# Patient Record
Sex: MALE | Race: BLACK OR AFRICAN AMERICAN | NOT HISPANIC OR LATINO | Employment: FULL TIME | URBAN - METROPOLITAN AREA
[De-identification: names, ages, dates, MRNs, and addresses within clinical notes are randomized per-mention and may not be internally consistent; named-entity substitution may affect disease eponyms.]

---

## 2022-08-17 ENCOUNTER — HOSPITAL ENCOUNTER (EMERGENCY)
Facility: HOSPITAL | Age: 62
Discharge: HOME | End: 2022-08-17
Attending: FAMILY MEDICINE
Payer: COMMERCIAL

## 2022-08-17 VITALS
TEMPERATURE: 98 F | DIASTOLIC BLOOD PRESSURE: 70 MMHG | RESPIRATION RATE: 20 BRPM | OXYGEN SATURATION: 100 % | SYSTOLIC BLOOD PRESSURE: 128 MMHG | HEIGHT: 71 IN | HEART RATE: 66 BPM | WEIGHT: 146.5 LBS | BODY MASS INDEX: 20.51 KG/M2

## 2022-08-17 DIAGNOSIS — R30.0 DYSURIA: ICD-10-CM

## 2022-08-17 DIAGNOSIS — Z20.2 TRICHOMONAS EXPOSURE: Primary | ICD-10-CM

## 2022-08-17 PROCEDURE — 99283 EMERGENCY DEPT VISIT LOW MDM: CPT

## 2022-08-17 RX ORDER — METRONIDAZOLE 500 MG/1
500 TABLET ORAL 2 TIMES DAILY
Qty: 14 TABLET | Refills: 0 | Status: SHIPPED | OUTPATIENT
Start: 2022-08-17 | End: 2022-08-24

## 2022-08-17 NOTE — ED PROVIDER NOTES
Encounter Date: 8/17/2022       History     Chief Complaint   Patient presents with    Urinary Frequency    Exposure to STD     61-year-old gentleman presents emergency room for evaluation.  Reports slight dysuria the last 2-3 days.  Patient reports that his partner was recently diagnosed with Trichomonas, patient was told to come to the emergency room for evaluation.  Patient denies any chest pain or shortness of breath.  Denies fever chills.  Denies abdominal pain.  Denies constipation or diarrhea.  Patient reports feeling his normal state of health otherwise.        Review of patient's allergies indicates:  No Known Allergies  No past medical history on file.  No past surgical history on file.  No family history on file.     Review of Systems   Constitutional: Negative for chills, fatigue and fever.   HENT: Negative for ear pain, rhinorrhea and sore throat.    Eyes: Negative for photophobia and pain.   Respiratory: Negative for cough, shortness of breath and wheezing.    Cardiovascular: Negative for chest pain.   Gastrointestinal: Negative for abdominal pain, diarrhea, nausea and vomiting.   Genitourinary: Positive for dysuria.   Neurological: Negative for dizziness, weakness and headaches.   All other systems reviewed and are negative.      Physical Exam     Initial Vitals [08/17/22 0222]   BP Pulse Resp Temp SpO2   128/70 66 20 98.1 °F (36.7 °C) 100 %      MAP       --         Physical Exam    Nursing note and vitals reviewed.  Constitutional: He appears well-developed and well-nourished.   HENT:   Head: Normocephalic and atraumatic.   Eyes: EOM are normal. Pupils are equal, round, and reactive to light.   Neck: Neck supple.   Normal range of motion.  Cardiovascular: Normal rate, regular rhythm and normal heart sounds. Exam reveals no gallop and no friction rub.    No murmur heard.  Pulmonary/Chest: Breath sounds normal. No respiratory distress.   Abdominal: Abdomen is soft. Bowel sounds are normal. He  exhibits no distension. There is no abdominal tenderness.   Musculoskeletal:         General: Normal range of motion.      Cervical back: Normal range of motion and neck supple.     Neurological: He is alert and oriented to person, place, and time. He has normal strength.   Skin: Skin is warm and dry.   Psychiatric: He has a normal mood and affect. His behavior is normal. Judgment and thought content normal.         ED Course   Procedures  Labs Reviewed - No data to display       Imaging Results    None          Medications - No data to display  Medical Decision Making:   Initial Assessment:   Discussed with patient regarding findings.  Will treat with Flagyl for suspected Trichomonas.  Discussed the patient about obtaining urinalysis for further checks, but patient currently interested in just being treated for the Trichomonas.  Will begin with this, patient informed that if he has recurrent symptoms or additional symptoms then to return to the emergency room or follow up primary care physician for further evaluation and treatment.                      Clinical Impression:   Final diagnoses:  [Z20.2] Trichomonas exposure (Primary)  [R30.0] Dysuria          ED Disposition Condition    Discharge Stable        ED Prescriptions     Medication Sig Dispense Start Date End Date Auth. Provider    metroNIDAZOLE (FLAGYL) 500 MG tablet Take 1 tablet (500 mg total) by mouth 2 (two) times a day. for 7 days 14 tablet 8/17/2022 8/24/2022 Taiwo Ling MD        Follow-up Information     Follow up With Specialties Details Why Contact Info    [facility] Emergency Dept Emergency Medicine  As needed, If symptoms worsen [address] [facility] [address] [phone]    Primary Care Physician  In 5 days             Taiwo Ling MD  08/17/22 0239       Taiwo Ling MD  08/17/22 0239

## 2025-03-20 ENCOUNTER — HOSPITAL ENCOUNTER (EMERGENCY)
Facility: HOSPITAL | Age: 65
Discharge: HOME | End: 2025-03-20
Attending: EMERGENCY MEDICINE
Payer: COMMERCIAL

## 2025-03-20 VITALS
HEIGHT: 72 IN | SYSTOLIC BLOOD PRESSURE: 117 MMHG | TEMPERATURE: 97 F | DIASTOLIC BLOOD PRESSURE: 70 MMHG | OXYGEN SATURATION: 99 % | WEIGHT: 134.94 LBS | RESPIRATION RATE: 18 BRPM | BODY MASS INDEX: 18.28 KG/M2 | HEART RATE: 60 BPM

## 2025-03-20 DIAGNOSIS — M54.9 BILATERAL BACK PAIN, UNSPECIFIED BACK LOCATION, UNSPECIFIED CHRONICITY: ICD-10-CM

## 2025-03-20 DIAGNOSIS — C18.9 MALIGNANT NEOPLASM OF COLON, UNSPECIFIED PART OF COLON: Primary | ICD-10-CM

## 2025-03-20 DIAGNOSIS — C78.7 METASTASES TO THE LIVER: ICD-10-CM

## 2025-03-20 LAB
ALBUMIN SERPL-MCNC: 3.5 G/DL (ref 3.4–4.8)
ALBUMIN/GLOB SERPL: 0.9 RATIO (ref 1.1–2)
ALP SERPL-CCNC: 262 UNIT/L (ref 40–150)
ALT SERPL-CCNC: 32 UNIT/L (ref 0–55)
ANION GAP SERPL CALC-SCNC: 6 MEQ/L
AST SERPL-CCNC: 48 UNIT/L (ref 5–34)
BACTERIA #/AREA URNS AUTO: ABNORMAL /HPF
BASOPHILS # BLD AUTO: 0.07 X10(3)/MCL
BASOPHILS NFR BLD AUTO: 1.1 %
BILIRUB SERPL-MCNC: 0.3 MG/DL
BILIRUB UR QL STRIP.AUTO: NEGATIVE
BUN SERPL-MCNC: 18.1 MG/DL (ref 8.4–25.7)
CALCIUM SERPL-MCNC: 9.5 MG/DL (ref 8.8–10)
CHLORIDE SERPL-SCNC: 106 MMOL/L (ref 98–107)
CLARITY UR: CLEAR
CO2 SERPL-SCNC: 27 MMOL/L (ref 23–31)
COLOR UR AUTO: YELLOW
CREAT SERPL-MCNC: 0.83 MG/DL (ref 0.72–1.25)
CREAT/UREA NIT SERPL: 22
EOSINOPHIL # BLD AUTO: 0.22 X10(3)/MCL (ref 0–0.9)
EOSINOPHIL NFR BLD AUTO: 3.6 %
ERYTHROCYTE [DISTWIDTH] IN BLOOD BY AUTOMATED COUNT: 12.7 % (ref 11.5–17)
GFR SERPLBLD CREATININE-BSD FMLA CKD-EPI: >60 ML/MIN/1.73/M2
GLOBULIN SER-MCNC: 3.8 GM/DL (ref 2.4–3.5)
GLUCOSE SERPL-MCNC: 89 MG/DL (ref 82–115)
GLUCOSE UR QL STRIP: NORMAL
HCT VFR BLD AUTO: 36.3 % (ref 42–52)
HGB BLD-MCNC: 12.4 G/DL (ref 14–18)
HGB UR QL STRIP: NEGATIVE
HOLD SPECIMEN: NORMAL
HYALINE CASTS #/AREA URNS LPF: ABNORMAL /LPF
IMM GRANULOCYTES # BLD AUTO: 0.01 X10(3)/MCL (ref 0–0.04)
IMM GRANULOCYTES NFR BLD AUTO: 0.2 %
KETONES UR QL STRIP: NEGATIVE
LEUKOCYTE ESTERASE UR QL STRIP: NEGATIVE
LIPASE SERPL-CCNC: 23 U/L
LYMPHOCYTES # BLD AUTO: 2 X10(3)/MCL (ref 0.6–4.6)
LYMPHOCYTES NFR BLD AUTO: 32.3 %
MAGNESIUM SERPL-MCNC: 2.1 MG/DL (ref 1.6–2.6)
MCH RBC QN AUTO: 31.7 PG (ref 27–31)
MCHC RBC AUTO-ENTMCNC: 34.2 G/DL (ref 33–36)
MCV RBC AUTO: 92.8 FL (ref 80–94)
MONOCYTES # BLD AUTO: 0.54 X10(3)/MCL (ref 0.1–1.3)
MONOCYTES NFR BLD AUTO: 8.7 %
MUCOUS THREADS URNS QL MICRO: ABNORMAL /LPF
NEUTROPHILS # BLD AUTO: 3.35 X10(3)/MCL (ref 2.1–9.2)
NEUTROPHILS NFR BLD AUTO: 54.1 %
NITRITE UR QL STRIP: NEGATIVE
NRBC BLD AUTO-RTO: 0 %
PH UR STRIP: 6.5 [PH]
PLATELET # BLD AUTO: 368 X10(3)/MCL (ref 130–400)
PMV BLD AUTO: 8.2 FL (ref 7.4–10.4)
POTASSIUM SERPL-SCNC: 4.5 MMOL/L (ref 3.5–5.1)
PROT SERPL-MCNC: 7.3 GM/DL (ref 5.8–7.6)
PROT UR QL STRIP: ABNORMAL
RBC # BLD AUTO: 3.91 X10(6)/MCL (ref 4.7–6.1)
RBC #/AREA URNS AUTO: ABNORMAL /HPF
SODIUM SERPL-SCNC: 139 MMOL/L (ref 136–145)
SP GR UR STRIP.AUTO: >1.03 (ref 1–1.03)
SQUAMOUS #/AREA URNS LPF: ABNORMAL /HPF
TSH SERPL-ACNC: 1.32 UIU/ML (ref 0.35–4.94)
UROBILINOGEN UR STRIP-ACNC: ABNORMAL
WBC # BLD AUTO: 6.19 X10(3)/MCL (ref 4.5–11.5)
WBC #/AREA URNS AUTO: ABNORMAL /HPF

## 2025-03-20 PROCEDURE — 25500020 PHARM REV CODE 255

## 2025-03-20 PROCEDURE — 84443 ASSAY THYROID STIM HORMONE: CPT | Performed by: EMERGENCY MEDICINE

## 2025-03-20 PROCEDURE — 81015 MICROSCOPIC EXAM OF URINE: CPT | Performed by: EMERGENCY MEDICINE

## 2025-03-20 PROCEDURE — 83735 ASSAY OF MAGNESIUM: CPT | Performed by: EMERGENCY MEDICINE

## 2025-03-20 PROCEDURE — 83690 ASSAY OF LIPASE: CPT | Performed by: EMERGENCY MEDICINE

## 2025-03-20 PROCEDURE — 85025 COMPLETE CBC W/AUTO DIFF WBC: CPT | Performed by: EMERGENCY MEDICINE

## 2025-03-20 PROCEDURE — 99285 EMERGENCY DEPT VISIT HI MDM: CPT | Mod: 25

## 2025-03-20 PROCEDURE — 80053 COMPREHEN METABOLIC PANEL: CPT | Performed by: EMERGENCY MEDICINE

## 2025-03-20 RX ADMIN — IOHEXOL 100 ML: 350 INJECTION, SOLUTION INTRAVENOUS at 01:03

## 2025-03-20 NOTE — Clinical Note
"Lincoln Omalley" [place] was seen and treated in our emergency department on 3/20/2025.  He may return to work on 03/22/2025.       If you have any questions or concerns, please don't hesitate to call.      Isai Crespo MD"

## 2025-03-20 NOTE — ED PROVIDER NOTES
Encounter Date: 3/20/2025       History     Chief Complaint   Patient presents with    Back Pain    Inguinal Hernia     PT W CHRONIC LOWER BACK PAIN WORSE X 1 MONTH.  DENIES INJURY.  ALSO THINKS HE HAS A RT INGUINAL HERNIA, ABLE TO REDUCE.  NO NV.  INTERMITTENT ABD CRAMPS.  PT ALSO REPORTS CHANGE IN BOWEL HABITS, INTERMITTENT BLD IN STOOL AND WT LOSS  FROM 210LB TO TODAY WT OF 135LB OVER A YEAR.  NO PCP.      Weight Loss     Patient with no known medical problems does not take any prescription medication is not followed by any primary care physician.  Patient states that he has had significant weight loss over the past year some deliver it is not really equivocal in his answering.  Denies history of cancer.  He does have a left-sided direct inguinal hernia that is easily reproducible that occurred over the last several months.  He denies any cough congestion chest pain shortness of breath or abdominal pain at this time no dysuria no headaches vision changes or problems swallowing.      Review of patient's allergies indicates:  No Known Allergies  History reviewed. No pertinent past medical history.  History reviewed. No pertinent surgical history.  No family history on file.  Social History[1]  Review of Systems   Constitutional:  Positive for unexpected weight change.   Gastrointestinal:         Right direct inguinal hernia   All other systems reviewed and are negative.      Physical Exam     Initial Vitals [03/20/25 1129]   BP Pulse Resp Temp SpO2   119/68 (!) 58 16 97.2 °F (36.2 °C) 100 %      MAP       --         Physical Exam    Nursing note and vitals reviewed.  Constitutional: He appears well-developed and well-nourished.   HENT:   Head: Normocephalic and atraumatic.   Eyes: EOM are normal. Pupils are equal, round, and reactive to light.   Neck:   Normal range of motion.  Cardiovascular:  Normal rate and regular rhythm.           Pulmonary/Chest: Breath sounds normal. No respiratory distress. He has no  wheezes. He has no rales.   Abdominal: Abdomen is soft. Bowel sounds are normal. He exhibits no distension. There is no abdominal tenderness.   No inguinal hernia palpated There is no rebound.   Genitourinary:    Penis normal.     Musculoskeletal:         General: Normal range of motion.      Cervical back: Normal range of motion.     Neurological: He is alert and oriented to person, place, and time.         ED Course   Procedures  Labs Reviewed   COMPREHENSIVE METABOLIC PANEL - Abnormal       Result Value    Sodium 139      Potassium 4.5      Chloride 106      CO2 27      Glucose 89      Blood Urea Nitrogen 18.1      Creatinine 0.83      Calcium 9.5      Protein Total 7.3      Albumin 3.5      Globulin 3.8 (*)     Albumin/Globulin Ratio 0.9 (*)     Bilirubin Total 0.3       (*)     ALT 32      AST 48 (*)     eGFR >60      Anion Gap 6.0      BUN/Creatinine Ratio 22     URINALYSIS, REFLEX TO URINE CULTURE - Abnormal    Color, UA Yellow      Appearance, UA Clear      Specific Gravity, UA >1.030 (*)     pH, UA 6.5      Protein, UA Trace (*)     Glucose, UA Normal      Ketones, UA Negative      Blood, UA Negative      Bilirubin, UA Negative      Urobilinogen, UA 2+ (*)     Nitrites, UA Negative      Leukocyte Esterase, UA Negative      RBC, UA 0-5      WBC, UA 0-5      Bacteria, UA Trace (*)     Squamous Epithelial Cells, UA Trace (*)     Mucous, UA Occasional (*)     Hyaline Casts, UA None Seen     CBC WITH DIFFERENTIAL - Abnormal    WBC 6.19      RBC 3.91 (*)     Hgb 12.4 (*)     Hct 36.3 (*)     MCV 92.8      MCH 31.7 (*)     MCHC 34.2      RDW 12.7      Platelet 368      MPV 8.2      Neut % 54.1      Lymph % 32.3      Mono % 8.7      Eos % 3.6      Basophil % 1.1      Imm Grans % 0.2      Neut # 3.35      Lymph # 2.00      Mono # 0.54      Eos # 0.22      Baso # 0.07      Imm Gran # 0.01      NRBC% 0.0     MAGNESIUM - Normal    Magnesium Level 2.10     LIPASE - Normal    Lipase Level 23     TSH - Normal     TSH 1.325     CBC W/ AUTO DIFFERENTIAL    Narrative:     The following orders were created for panel order CBC auto differential.  Procedure                               Abnormality         Status                     ---------                               -----------         ------                     CBC with Differential[[id number]]        Abnormal            Final result                 Please view results for these tests on the individual orders.   EXTRA TUBES    Narrative:     The following orders were created for panel order EXTRA TUBES.  Procedure                               Abnormality         Status                     ---------                               -----------         ------                     Light Blue Top Hold[[id number]]                              Final result               Gold Top Hold[[id number]]                                    Final result               Pink Top Hold[[id number]]                                    Final result                 Please view results for these tests on the individual orders.   LIGHT BLUE TOP HOLD    Extra Tube Hold for add-ons.     GOLD TOP HOLD    Extra Tube Hold for add-ons.     PINK TOP HOLD    Extra Tube Hold for add-ons.            Imaging Results              X-Ray Chest 1 View (Final result)  Result time 03/20/25 15:12:12      Final result by Alvaro Irene MD (03/20/25 15:12:12)                   Impression:      No acute findings.      Electronically signed by: Alvaro Irene  Date:    03/20/2025  Time:    15:12               Narrative:    EXAMINATION:  XR CHEST 1 VIEW    CLINICAL HISTORY:  Back pain.Cancer; weight loss.    COMPARISON:  10 September 2019    FINDINGS:  Frontal view of the chest was obtained. Heart is not enlarged.  Grossly clear lungs.  No pneumothorax.                                       CT Abdomen Pelvis With IV Contrast NO Oral Contrast (Final result)  Result time 03/20/25 14:12:17      Final result by Alvaro Irene MD  (03/20/25 14:12:17)                   Impression:      Mass of the sigmoid colon compatible with malignancy.  Extensive hepatic metastatic disease.      Electronically signed by: Alvaro Irene  Date:    03/20/2025  Time:    14:12               Narrative:    EXAMINATION:  CT ABDOMEN PELVIS WITH IV CONTRAST    CLINICAL HISTORY:  back/abd pain, unexplained weight loss;    TECHNIQUE:  Helical acquisition through the abdomen and pelvis with IV contrast.  Three plane reconstructions were provided for review.  mGycm. Automatic exposure control, adjustment of mA/kV or iterative reconstruction technique was used to reduce radiation.    COMPARISON:  No prior CT    FINDINGS:  There is indeterminate 4 mm right middle lobe nodule image 3 series 3.    There is widespread hepatic metastatic disease.  A lesion in the left lobe on image 23 series 5 measures up to about 4 cm.  Patent portal vein.    No significant abnormality of the gallbladder, spleen, pancreas or adrenals.  No hydronephrosis.    There is masslike wall thickening of the distal sigmoid on image 113 series 5.  There is adjacent mesenteric metastatic disease for example a soft tissue nodule on image 96 series 5 measures up to almost 3 cm.  No free air.  No drainable peritoneal collection.    Mild bladder wall thickening.  Prostate mildly enlarged.  No pelvic free fluid.  Abdominal aorta normal in caliber.    No acute osseous findings.                                       Medications   iohexoL (OMNIPAQUE 350) 350 mg iodine/mL injection (100 mLs Intravenous Given 3/20/25 1310)     Medical Decision Making  Amount and/or Complexity of Data Reviewed  Labs: ordered.  Radiology: ordered.                          Medical Decision Making:   Initial Assessment:   Workup in progress overall patient well-appearing has been losing weight over the last year in his primary concern is having possible cancer.  Stable vitals.  With workup in progress  Differential Diagnosis:    Malignancy, failure to thrive, hyperthyroidism, depression, dehydration, direct hernia, incarcerated hernia, strangulated hernia  ED Management:  Patient found to have colon mass with metastases to liver normal chest x-ray labs are otherwise within normal limits nonsignificant discussed with internal medicine to admit for further workup biopsy however we do not have IR until Monday therefore they will consult on the patient to have him entered into the system and to establish plan of care and outpatient testing.    Time 4:47 p.m.   Discussed with internal medicine team we did not have IR GI available until next week patient hemodynamically stable well-appearing this has been a chronic issue therefore will not admit the patient as he would stay in the hospital over the weekend with no management.  Will place referrals to Internal Medicine and GI were course coordinator can help facilitate outpatient management of patient's condition.  Return precautions provided to the patient understands and agrees with plan of care             Clinical Impression:  Final diagnoses:  [M54.9] Bilateral back pain, unspecified back location, unspecified chronicity  [C18.9] Malignant neoplasm of colon, unspecified part of colon (Primary)  [C78.7] Metastases to the liver          ED Disposition Condition    Discharge Stable          ED Prescriptions    None       Follow-up Information    None              [1]   Social History  Tobacco Use    Smoking status: Every Day     Current packs/day: 0.50     Average packs/day: 0.5 packs/day for 45.2 years (22.6 ttl pk-yrs)     Types: Cigarettes     Start date: 1980    Smokeless tobacco: Never   Substance Use Topics    Alcohol use: Not Currently     Comment: RARE    Drug use: Not Currently        Isai Crespo MD  03/20/25 [id number]

## 2025-03-20 NOTE — DISCHARGE INSTRUCTIONS
Referrals been placing you will be called by internal medicine and gastroenterology for outpatient follow up.    Any worsening symptoms or develop of new symptoms or concerns please seek medical evaluation sooner

## 2025-03-20 NOTE — Clinical Note
"Lincoln Vizcarra (William) was seen and treated in our emergency department on 3/20/2025.  He may return to work on 03/24/2025.       If you have any questions or concerns, please don't hesitate to call.       RN    "

## 2025-03-31 ENCOUNTER — HOSPITAL ENCOUNTER (EMERGENCY)
Facility: HOSPITAL | Age: 65
Discharge: HOME | End: 2025-03-31
Attending: INTERNAL MEDICINE
Payer: COMMERCIAL

## 2025-03-31 VITALS
DIASTOLIC BLOOD PRESSURE: 66 MMHG | RESPIRATION RATE: 20 BRPM | SYSTOLIC BLOOD PRESSURE: 115 MMHG | TEMPERATURE: 98 F | HEART RATE: 65 BPM | WEIGHT: 129 LBS | OXYGEN SATURATION: 100 % | BODY MASS INDEX: 17.49 KG/M2

## 2025-03-31 DIAGNOSIS — C78.7 METASTASIS TO LIVER: ICD-10-CM

## 2025-03-31 DIAGNOSIS — K63.89 MASS OF COLON: Primary | ICD-10-CM

## 2025-03-31 LAB
ALBUMIN SERPL-MCNC: 3.4 G/DL (ref 3.4–4.8)
ALBUMIN/GLOB SERPL: 0.8 RATIO (ref 1.1–2)
ALP SERPL-CCNC: 390 UNIT/L (ref 40–150)
ALT SERPL-CCNC: 43 UNIT/L (ref 0–55)
ANION GAP SERPL CALC-SCNC: 8 MEQ/L
AST SERPL-CCNC: 39 UNIT/L (ref 11–45)
BACTERIA #/AREA URNS AUTO: ABNORMAL /HPF
BASOPHILS # BLD AUTO: 0.06 X10(3)/MCL
BASOPHILS NFR BLD AUTO: 0.8 %
BILIRUB SERPL-MCNC: 0.9 MG/DL
BILIRUB UR QL STRIP.AUTO: NEGATIVE
BUN SERPL-MCNC: 23.6 MG/DL (ref 8.4–25.7)
CALCIUM SERPL-MCNC: 9.6 MG/DL (ref 8.8–10)
CANCER AG19-9 SERPL-ACNC: 215.32 UNIT/ML (ref 0–37)
CEA SERPL-MCNC: 197.11 NG/ML (ref 0–3)
CHLORIDE SERPL-SCNC: 107 MMOL/L (ref 98–107)
CLARITY UR: CLEAR
CO2 SERPL-SCNC: 23 MMOL/L (ref 23–31)
COLOR UR AUTO: YELLOW
CREAT SERPL-MCNC: 0.78 MG/DL (ref 0.72–1.25)
CREAT/UREA NIT SERPL: 30
EOSINOPHIL # BLD AUTO: 0.1 X10(3)/MCL (ref 0–0.9)
EOSINOPHIL NFR BLD AUTO: 1.3 %
ERYTHROCYTE [DISTWIDTH] IN BLOOD BY AUTOMATED COUNT: 12.7 % (ref 11.5–17)
GFR SERPLBLD CREATININE-BSD FMLA CKD-EPI: >60 ML/MIN/1.73/M2
GLOBULIN SER-MCNC: 4.3 GM/DL (ref 2.4–3.5)
GLUCOSE SERPL-MCNC: 90 MG/DL (ref 82–115)
GLUCOSE UR QL STRIP: NORMAL
HCT VFR BLD AUTO: 35.5 % (ref 42–52)
HGB BLD-MCNC: 12.3 G/DL (ref 14–18)
HGB UR QL STRIP: NEGATIVE
HOLD SPECIMEN: NORMAL
HYALINE CASTS #/AREA URNS LPF: ABNORMAL /LPF
IMM GRANULOCYTES # BLD AUTO: 0.02 X10(3)/MCL (ref 0–0.04)
IMM GRANULOCYTES NFR BLD AUTO: 0.3 %
KETONES UR QL STRIP: ABNORMAL
LEUKOCYTE ESTERASE UR QL STRIP: NEGATIVE
LIPASE SERPL-CCNC: 17 U/L
LYMPHOCYTES # BLD AUTO: 1.79 X10(3)/MCL (ref 0.6–4.6)
LYMPHOCYTES NFR BLD AUTO: 23.4 %
MCH RBC QN AUTO: 32.3 PG (ref 27–31)
MCHC RBC AUTO-ENTMCNC: 34.6 G/DL (ref 33–36)
MCV RBC AUTO: 93.2 FL (ref 80–94)
MONOCYTES # BLD AUTO: 0.91 X10(3)/MCL (ref 0.1–1.3)
MONOCYTES NFR BLD AUTO: 11.9 %
MUCOUS THREADS URNS QL MICRO: ABNORMAL /LPF
NEUTROPHILS # BLD AUTO: 4.78 X10(3)/MCL (ref 2.1–9.2)
NEUTROPHILS NFR BLD AUTO: 62.3 %
NITRITE UR QL STRIP: NEGATIVE
NRBC BLD AUTO-RTO: 0 %
PH UR STRIP: 5.5 [PH]
PLATELET # BLD AUTO: 387 X10(3)/MCL (ref 130–400)
PMV BLD AUTO: 8.6 FL (ref 7.4–10.4)
POTASSIUM SERPL-SCNC: 4 MMOL/L (ref 3.5–5.1)
PROT SERPL-MCNC: 7.7 GM/DL (ref 5.8–7.6)
PROT UR QL STRIP: ABNORMAL
RBC # BLD AUTO: 3.81 X10(6)/MCL (ref 4.7–6.1)
RBC #/AREA URNS AUTO: ABNORMAL /HPF
SODIUM SERPL-SCNC: 138 MMOL/L (ref 136–145)
SP GR UR STRIP.AUTO: 1.03 (ref 1–1.03)
SQUAMOUS #/AREA URNS LPF: ABNORMAL /HPF
UROBILINOGEN UR STRIP-ACNC: ABNORMAL
WBC # BLD AUTO: 7.66 X10(3)/MCL (ref 4.5–11.5)
WBC #/AREA URNS AUTO: ABNORMAL /HPF

## 2025-03-31 PROCEDURE — 83690 ASSAY OF LIPASE: CPT

## 2025-03-31 PROCEDURE — 81015 MICROSCOPIC EXAM OF URINE: CPT

## 2025-03-31 PROCEDURE — 99284 EMERGENCY DEPT VISIT MOD MDM: CPT

## 2025-03-31 PROCEDURE — 85025 COMPLETE CBC W/AUTO DIFF WBC: CPT

## 2025-03-31 PROCEDURE — 82378 CARCINOEMBRYONIC ANTIGEN: CPT

## 2025-03-31 PROCEDURE — 80053 COMPREHEN METABOLIC PANEL: CPT

## 2025-03-31 PROCEDURE — 25000003 PHARM REV CODE 250

## 2025-03-31 PROCEDURE — 86301 IMMUNOASSAY TUMOR CA 19-9: CPT

## 2025-03-31 RX ORDER — HYDROCODONE BITARTRATE AND ACETAMINOPHEN 5; 325 MG/1; MG/1
1 TABLET ORAL EVERY 6 HOURS PRN
Qty: 20 TABLET | Refills: 0 | Status: SHIPPED | OUTPATIENT
Start: 2025-03-31 | End: 2025-04-05

## 2025-03-31 RX ORDER — HYDROCODONE BITARTRATE AND ACETAMINOPHEN 5; 325 MG/1; MG/1
1 TABLET ORAL
Refills: 0 | Status: COMPLETED | OUTPATIENT
Start: 2025-03-31 | End: 2025-03-31

## 2025-03-31 RX ORDER — POLYETHYLENE GLYCOL 3350 17 G/17G
17 POWDER, FOR SOLUTION ORAL DAILY
Qty: 14 EACH | Refills: 0 | Status: SHIPPED | OUTPATIENT
Start: 2025-03-31 | End: 2025-03-31

## 2025-03-31 RX ORDER — HYDROCODONE BITARTRATE AND ACETAMINOPHEN 5; 325 MG/1; MG/1
1 TABLET ORAL EVERY 6 HOURS PRN
Qty: 20 TABLET | Refills: 0 | Status: SHIPPED | OUTPATIENT
Start: 2025-03-31 | End: 2025-03-31

## 2025-03-31 RX ORDER — POLYETHYLENE GLYCOL 3350 17 G/17G
17 POWDER, FOR SOLUTION ORAL DAILY
Qty: 14 EACH | Refills: 0 | Status: ON HOLD | OUTPATIENT
Start: 2025-03-31 | End: 2025-04-11

## 2025-03-31 RX ADMIN — HYDROCODONE BITARTRATE AND ACETAMINOPHEN 1 TABLET: 5; 325 TABLET ORAL at 09:03

## 2025-04-01 ENCOUNTER — RESULTS FOLLOW-UP (OUTPATIENT)
Dept: INTERNAL MEDICINE | Facility: CLINIC | Age: 65
End: 2025-04-01

## 2025-04-01 ENCOUNTER — TELEPHONE (OUTPATIENT)
Dept: INTERNAL MEDICINE | Facility: CLINIC | Age: 65
End: 2025-04-01
Payer: COMMERCIAL

## 2025-04-01 NOTE — ED PROVIDER NOTES
"Encounter Date: 3/31/2025       History     Chief Complaint   Patient presents with    Back Pain    Constipation     Low back pain and constipation.  Has been having issues for "a while."  Recent dx of colon cancer, but has not been able to see oncologist yet     A 64 y.o. male patient with a history of no known medical problems (recent mass of sigmoid colon with mets to liver found on CT) presents to the ED with concern for continuing pain and constipation as previously evaluated for without follow-up from GI or oncology yet. Patient was seen in ED 10 days ago. A mass was found in his colon and patient is under the understanding it is likely malignant. He was supposed to follow-up with GI for colonoscopy with biopsy but he states no one ever called him with an appointment yet. Patient states his symptoms are bilateral low back pain that is a "pressure" feeling and constipation that resolves with docolax. Last BM yesterday.       The history is provided by the patient.     Review of patient's allergies indicates:  No Known Allergies  History reviewed. No pertinent past medical history.  History reviewed. No pertinent surgical history.  No family history on file.  Social History[1]  Review of Systems   Constitutional:  Negative for chills and fever.   Eyes:  Negative for visual disturbance.   Respiratory:  Negative for shortness of breath.    Cardiovascular:  Negative for chest pain.   Gastrointestinal:  Positive for constipation. Negative for abdominal distention, abdominal pain, blood in stool, diarrhea, nausea and vomiting.   Genitourinary:  Negative for difficulty urinating and dysuria.   Musculoskeletal:  Positive for back pain. Negative for arthralgias.   Skin:  Negative for color change and rash.   Neurological:  Negative for weakness and headaches.   Hematological:  Does not bruise/bleed easily.   Psychiatric/Behavioral:  Negative for confusion.    All other systems reviewed and are negative.      Physical " Exam     Initial Vitals [03/31/25 0835]   BP Pulse Resp Temp SpO2   105/65 87 20 98.1 °F (36.7 °C) 100 %      MAP       --         Physical Exam    Nursing note and vitals reviewed.  Constitutional: He appears well-developed and well-nourished.   HENT:   Head: Normocephalic and atraumatic.   Right Ear: External ear normal.   Left Ear: External ear normal.   Nose: Nose normal.   Eyes: Conjunctivae and EOM are normal.   Neck: Neck supple.   Cardiovascular:  Normal rate, regular rhythm and normal heart sounds.     Exam reveals no gallop and no friction rub.       No murmur heard.  Pulmonary/Chest: Breath sounds normal. No respiratory distress. He has no wheezes. He has no rhonchi. He has no rales.   Abdominal: Abdomen is soft. Bowel sounds are normal. He exhibits no distension and no mass. There is no abdominal tenderness. There is no rebound and no guarding.   Musculoskeletal:      Cervical back: Neck supple.     Neurological: He is alert and oriented to person, place, and time. GCS eye subscore is 4. GCS verbal subscore is 5. GCS motor subscore is 6.   Skin: Skin is warm and dry. Capillary refill takes less than 2 seconds.         ED Course   Procedures  Labs Reviewed   COMPREHENSIVE METABOLIC PANEL - Abnormal       Result Value    Sodium 138      Potassium 4.0      Chloride 107      CO2 23      Glucose 90      Blood Urea Nitrogen 23.6      Creatinine 0.78      Calcium 9.6      Protein Total 7.7 (*)     Albumin 3.4      Globulin 4.3 (*)     Albumin/Globulin Ratio 0.8 (*)     Bilirubin Total 0.9       (*)     ALT 43      AST 39      eGFR >60      Anion Gap 8.0      BUN/Creatinine Ratio 30     CANCER ANTIGEN 19-9 - Abnormal    CA 19-9 215.32 (*)     Narrative:     The testing method is a chemiluminescent microparticle immunoassay manufactured by [employer] and performed on the [employer] or [employer] system. Values obtained with different assay manufacturers for methods may be different and cannot be  used interchangeably.   CEA - Abnormal    Carcinoembryonic Antigen 197.11 (*)     Narrative:     The testing method is a chemiluminescent microparticle immunoassay manufactured by [employer] and performed on the  or [employer] system. Values obtained with different assay manufacturers for methods may be different and cannot be used interchangeably.   URINALYSIS, REFLEX TO URINE CULTURE - Abnormal    Color, UA Yellow      Appearance, UA Clear      Specific Gravity, UA 1.034 (*)     pH, UA 5.5      Protein, UA 1+ (*)     Glucose, UA Normal      Ketones, UA 1+ (*)     Blood, UA Negative      Bilirubin, UA Negative      Urobilinogen, UA 2+ (*)     Nitrites, UA Negative      Leukocyte Esterase, UA Negative      RBC, UA 0-5      WBC, UA 0-5      Bacteria, UA None Seen      Squamous Epithelial Cells, UA Trace (*)     Mucous, UA Many (*)     Hyaline Casts, UA None Seen     CBC WITH DIFFERENTIAL - Abnormal    WBC 7.66      RBC 3.81 (*)     Hgb 12.3 (*)     Hct 35.5 (*)     MCV 93.2      MCH 32.3 (*)     MCHC 34.6      RDW 12.7      Platelet 387      MPV 8.6      Neut % 62.3      Lymph % 23.4      Mono % 11.9      Eos % 1.3      Basophil % 0.8      Imm Grans % 0.3      Neut # 4.78      Lymph # 1.79      Mono # 0.91      Eos # 0.10      Baso # 0.06      Imm Gran # 0.02      NRBC% 0.0     LIPASE - Normal    Lipase Level 17     CBC W/ AUTO DIFFERENTIAL    Narrative:     The following orders were created for panel order CBC Auto Differential.  Procedure                               Abnormality         Status                     ---------                               -----------         ------                     CBC with Differential[[id number]]        Abnormal            Final result                 Please view results for these tests on the individual orders.   EXTRA TUBES    Narrative:     The following orders were created for panel order EXTRA TUBES.  Procedure                               Abnormality       "   Status                     ---------                               -----------         ------                     Light Blue Top Hold[[id number]]                             Final result               Red Top Hold[[id number]]                                    Final result               Light Green Top Hold[[id number]]                            Final result               Lavender Top Hold[[id number]]                               Final result               Gold Top Hold[[id number]]                                   Final result               Gold Top Hold[[id number]]                                   Final result               Pink Top Hold[[id number]]                                   Final result                 Please view results for these tests on the individual orders.   LIGHT BLUE TOP HOLD    Extra Tube Hold for add-ons.     RED TOP HOLD    Extra Tube Hold for add-ons.     LIGHT GREEN TOP HOLD    Extra Tube Hold for add-ons.     LAVENDER TOP HOLD    Extra Tube Hold for add-ons.     GOLD TOP HOLD    Extra Tube Hold for add-ons.     GOLD TOP HOLD    Extra Tube Hold for add-ons.     PINK TOP HOLD    Extra Tube Hold for add-ons.            Imaging Results    None          Medications   HYDROcodone-acetaminophen 5-325 mg per tablet 1 tablet (1 tablet Oral Given 3/31/25 0943)     Medical Decision Making  A 64 y.o. male patient with a history of no known medical problems (recent mass of sigmoid colon with mets to liver found on CT) presents to the ED with concern for continuing pain and constipation as previously evaluated for without follow-up from GI or oncology yet. Patient was seen in ED 10 days ago. A mass was found in his colon and patient is under the understanding it is likely malignant. He was supposed to follow-up with GI for colonoscopy with biopsy but he states no one ever called him with an appointment yet. Patient states his symptoms are bilateral low back pain that is a "pressure" feeling and " constipation that resolves with docolax. Last BM yesterday.       Labs similar to previous. CEA and CA 19-9 elevated. GI referral sent with  also messaged for urgency of follow-up.     Patient's condition improved with the following Medications  HYDROcodone-acetaminophen 5-325 mg per tablet 1 tablet (1 tablet Oral Given 3/31/25 0943)    Clinical impression:  Mass of colon (Primary)  Metastasis to liver    Patient is non-toxic appearing and tolerating nutritional intake. Patient's vital signs and clinical condition are stable for DC with ED Prescriptions     Medication Sig Dispense Start Date End Date Auth. Provider    HYDROcodone-acetaminophen (NORCO) 5-325 mg per tablet  (Status:   Discontinued) Take 1 tablet by mouth every 6 (six) hours as needed for   Pain. 20 tablet 3/31/2025 3/31/2025 Mealnie Chenil MOOSE, PA    polyethylene glycol (MIRALAX) 17 gram PwPk  (Status: Discontinued) Take   17 g by mouth once daily. for 14 days 14 each 3/31/2025 3/31/2025 Gustavo   Peggy G, PA    HYDROcodone-acetaminophen (NORCO) 5-325 mg per tablet Take 1 tablet by   mouth every 6 (six) hours as needed for Pain. 20 tablet 3/31/2025 4/5/2025   Gustavo Peggy G, PA    polyethylene glycol (MIRALAX) 17 gram PwPk Take 17 g by mouth once daily.   for 14 days 14 each 3/31/2025 4/14/2025 Peggy Chen PA         Follow-up: PCP or Internal medicine clinic within 3 days  Referrals made: gastroenterology, urgently    Strict follow-up precautions given. Patient verbalizes understanding of treatment plan and ED return precautions.         Amount and/or Complexity of Data Reviewed  Labs: ordered. Decision-making details documented in ED Course.    Risk  OTC drugs.  Prescription drug management.  Risk Details: Given strict ED return precautions. I have spoken with the patient and/or caregivers. I have explained the patient's condition, diagnoses and treatment plan based on the information available to me at this time. I have  answered the patient's and/or caregiver's questions and addressed any concerns. The patient and/or caregivers have as good an understanding of the patient's diagnosis, condition and treatment plan as can be expected at this point. The patient's condition is stable and appropriate for discharge from the emergency department.      The patient will pursue further outpatient evaluation with the primary care physician or other designated or consulting physician as outlined in the discharge instructions. The patient and/or caregivers are agreeable to this plan of care and follow-up instructions have been explained in detail. The patient and/or caregivers have received these instructions in written format and have expressed an understanding of the discharge instructions. The patient and/or caregivers are aware that any significant change in condition or worsening of symptoms should prompt an immediate return to this or the closest emergency department or a call to 911.               Additional MDM:   Differential Diagnosis:   Other: The following diagnoses were also considered and will be evaluated: Malignancy, Constipation and Gastroenteritis.            ED Course as of 03/31/25 2018   Mon Mar 31, 2025   1101 WBC: 7.66 [AG]   1101 Hemoglobin(!): 12.3 [AG]   1101 ALP(!): 390 [AG]   1101 eGFR: >60 [AG]   1147 Bacteria, UA: None Seen [AG]      ED Course User Index  [AG] Peggy Chen PA                           Clinical Impression:  Final diagnoses:  [K63.89] Mass of colon (Primary)  [C78.7] Metastasis to liver          ED Disposition Condition    Discharge Stable          ED Prescriptions       Medication Sig Dispense Start Date End Date Auth. Provider    HYDROcodone-acetaminophen (NORCO) 5-325 mg per tablet  (Status: Discontinued) Take 1 tablet by mouth every 6 (six) hours as needed for Pain. 20 tablet 3/31/2025 3/31/2025 Peggy Chen PA    polyethylene glycol (MIRALAX) 17 gram PwPk  (Status: Discontinued) Take  17 g by mouth once daily. for 14 days 14 each 3/31/2025 3/31/2025 Peggy Chen PA    HYDROcodone-acetaminophen (NORCO) 5-325 mg per tablet Take 1 tablet by mouth every 6 (six) hours as needed for Pain. 20 tablet 3/31/2025 4/5/2025 Peggy Chen PA    polyethylene glycol (MIRALAX) 17 gram PwPk Take 17 g by mouth once daily. for 14 days 14 each 3/31/2025 4/14/2025 Peggy Chen PA          Follow-up Information       Follow up With Specialties Details Why Contact Info Additional Information    [facility] Emergency Medicine Go to  If symptoms worsen, As needed [address] [place] [id number] [phone]     [facility] Gastro[facility] Gastroenterology Call  To schedule an appointment [address] [place] [phone] Entrance 1 for clinic visits If your appointment is a fibroscan, please present to GI Lab on 5th Floor.                [1]   Social History  Tobacco Use    Smoking status: Every Day     Current packs/day: 0.50     Average packs/day: 0.5 packs/day for 45.2 years (22.6 ttl pk-yrs)     Types: Cigarettes     Start date: 1980    Smokeless tobacco: Never   Substance Use Topics    Alcohol use: Not Currently     Comment: RARE    Drug use: Not Currently        Peggy Chen PA  03/31/25 2018

## 2025-04-01 NOTE — TELEPHONE ENCOUNTER
----- Message from Javi Cole MD sent at 4/1/2025  3:11 AM CDT -----    ----- Message -----  From: Lab, Background User  Sent: 3/31/2025   3:08 PM CDT  To: [facility] Results Follow Up Pool

## 2025-04-07 ENCOUNTER — HOSPITAL ENCOUNTER (INPATIENT)
Facility: HOSPITAL | Age: 65
LOS: 4 days | Discharge: HOME | DRG: 356 | End: 2025-04-11
Attending: EMERGENCY MEDICINE | Admitting: INTERNAL MEDICINE
Payer: COMMERCIAL

## 2025-04-07 DIAGNOSIS — R52 INTRACTABLE PAIN: ICD-10-CM

## 2025-04-07 DIAGNOSIS — Z12.11 SCREEN FOR COLON CANCER: Primary | ICD-10-CM

## 2025-04-07 DIAGNOSIS — K59.00 CONSTIPATION, UNSPECIFIED CONSTIPATION TYPE: ICD-10-CM

## 2025-04-07 DIAGNOSIS — K63.89 COLONIC MASS: Primary | ICD-10-CM

## 2025-04-07 DIAGNOSIS — C79.51 CANCER, METASTATIC TO BONE: ICD-10-CM

## 2025-04-07 DIAGNOSIS — G89.3 CANCER RELATED PAIN: ICD-10-CM

## 2025-04-07 DIAGNOSIS — C78.7 METASTATIC CANCER TO LIVER: ICD-10-CM

## 2025-04-07 LAB
AFP-TM SERPL-MCNC: 2.7 NG/ML
ALBUMIN SERPL-MCNC: 3.7 G/DL (ref 3.4–4.8)
ALBUMIN/GLOB SERPL: 0.7 RATIO (ref 1.1–2)
ALP SERPL-CCNC: 862 UNIT/L (ref 40–150)
ALT SERPL-CCNC: 169 UNIT/L (ref 0–55)
ANION GAP SERPL CALC-SCNC: 12 MEQ/L
AST SERPL-CCNC: 180 UNIT/L (ref 11–45)
BASOPHILS # BLD AUTO: 0.05 X10(3)/MCL
BASOPHILS NFR BLD AUTO: 0.6 %
BILIRUB SERPL-MCNC: 1 MG/DL
BUN SERPL-MCNC: 26.9 MG/DL (ref 8.4–25.7)
CALCIUM SERPL-MCNC: 10.1 MG/DL (ref 8.8–10)
CANCER AG125 SERPL-ACNC: 92 UNIT/ML (ref 0–35)
CANCER AG19-9 SERPL-ACNC: 266.23 UNIT/ML (ref 0–37)
CEA SERPL-MCNC: 354.38 NG/ML (ref 0–3)
CHLORIDE SERPL-SCNC: 102 MMOL/L (ref 98–107)
CO2 SERPL-SCNC: 20 MMOL/L (ref 23–31)
CREAT SERPL-MCNC: 0.85 MG/DL (ref 0.72–1.25)
CREAT/UREA NIT SERPL: 32
EOSINOPHIL # BLD AUTO: 0.04 X10(3)/MCL (ref 0–0.9)
EOSINOPHIL NFR BLD AUTO: 0.5 %
ERYTHROCYTE [DISTWIDTH] IN BLOOD BY AUTOMATED COUNT: 13 % (ref 11.5–17)
GFR SERPLBLD CREATININE-BSD FMLA CKD-EPI: >60 ML/MIN/1.73/M2
GLOBULIN SER-MCNC: 5.2 GM/DL (ref 2.4–3.5)
GLUCOSE SERPL-MCNC: 97 MG/DL (ref 82–115)
HCT VFR BLD AUTO: 36.8 % (ref 42–52)
HGB BLD-MCNC: 12.1 G/DL (ref 14–18)
IMM GRANULOCYTES # BLD AUTO: 0.03 X10(3)/MCL (ref 0–0.04)
IMM GRANULOCYTES NFR BLD AUTO: 0.4 %
LYMPHOCYTES # BLD AUTO: 1.47 X10(3)/MCL (ref 0.6–4.6)
LYMPHOCYTES NFR BLD AUTO: 17.9 %
MCH RBC QN AUTO: 31.8 PG (ref 27–31)
MCHC RBC AUTO-ENTMCNC: 32.9 G/DL (ref 33–36)
MCV RBC AUTO: 96.6 FL (ref 80–94)
MONOCYTES # BLD AUTO: 1.31 X10(3)/MCL (ref 0.1–1.3)
MONOCYTES NFR BLD AUTO: 16 %
NEUTROPHILS # BLD AUTO: 5.29 X10(3)/MCL (ref 2.1–9.2)
NEUTROPHILS NFR BLD AUTO: 64.6 %
NRBC BLD AUTO-RTO: 0 %
PLATELET # BLD AUTO: 348 X10(3)/MCL (ref 130–400)
PMV BLD AUTO: 8.8 FL (ref 7.4–10.4)
POTASSIUM SERPL-SCNC: 5 MMOL/L (ref 3.5–5.1)
PROT SERPL-MCNC: 8.9 GM/DL (ref 5.8–7.6)
RBC # BLD AUTO: 3.81 X10(6)/MCL (ref 4.7–6.1)
SODIUM SERPL-SCNC: 134 MMOL/L (ref 136–145)
WBC # BLD AUTO: 8.19 X10(3)/MCL (ref 4.5–11.5)

## 2025-04-07 PROCEDURE — 80053 COMPREHEN METABOLIC PANEL: CPT | Performed by: EMERGENCY MEDICINE

## 2025-04-07 PROCEDURE — 82105 ALPHA-FETOPROTEIN SERUM: CPT | Performed by: PHYSICIAN ASSISTANT

## 2025-04-07 PROCEDURE — 86301 IMMUNOASSAY TUMOR CA 19-9: CPT | Performed by: PHYSICIAN ASSISTANT

## 2025-04-07 PROCEDURE — 25000003 PHARM REV CODE 250: Performed by: PHYSICIAN ASSISTANT

## 2025-04-07 PROCEDURE — 99285 EMERGENCY DEPT VISIT HI MDM: CPT | Mod: 25

## 2025-04-07 PROCEDURE — 96361 HYDRATE IV INFUSION ADD-ON: CPT

## 2025-04-07 PROCEDURE — 63600175 PHARM REV CODE 636 W HCPCS: Performed by: EMERGENCY MEDICINE

## 2025-04-07 PROCEDURE — 25500020 PHARM REV CODE 255: Performed by: EMERGENCY MEDICINE

## 2025-04-07 PROCEDURE — 96375 TX/PRO/DX INJ NEW DRUG ADDON: CPT

## 2025-04-07 PROCEDURE — 25000003 PHARM REV CODE 250: Performed by: INTERNAL MEDICINE

## 2025-04-07 PROCEDURE — 96374 THER/PROPH/DIAG INJ IV PUSH: CPT

## 2025-04-07 PROCEDURE — 11000001 HC ACUTE MED/SURG PRIVATE ROOM

## 2025-04-07 PROCEDURE — 25000003 PHARM REV CODE 250: Performed by: EMERGENCY MEDICINE

## 2025-04-07 PROCEDURE — 82378 CARCINOEMBRYONIC ANTIGEN: CPT | Performed by: PHYSICIAN ASSISTANT

## 2025-04-07 PROCEDURE — 85025 COMPLETE CBC W/AUTO DIFF WBC: CPT | Performed by: EMERGENCY MEDICINE

## 2025-04-07 PROCEDURE — 86304 IMMUNOASSAY TUMOR CA 125: CPT | Performed by: PHYSICIAN ASSISTANT

## 2025-04-07 RX ORDER — SODIUM, POTASSIUM,MAG SULFATES 17.5-3.13G
1 SOLUTION, RECONSTITUTED, ORAL ORAL 2 TIMES DAILY
Status: COMPLETED | OUTPATIENT
Start: 2025-04-07 | End: 2025-04-08

## 2025-04-07 RX ORDER — AMOXICILLIN 250 MG
1 CAPSULE ORAL 2 TIMES DAILY
Status: DISCONTINUED | OUTPATIENT
Start: 2025-04-07 | End: 2025-04-11 | Stop reason: HOSPADM

## 2025-04-07 RX ORDER — POLYETHYLENE GLYCOL 3350 17 G/17G
17 POWDER, FOR SOLUTION ORAL DAILY
Status: DISCONTINUED | OUTPATIENT
Start: 2025-04-07 | End: 2025-04-11 | Stop reason: HOSPADM

## 2025-04-07 RX ORDER — MORPHINE SULFATE 4 MG/ML
4 INJECTION, SOLUTION INTRAMUSCULAR; INTRAVENOUS
Refills: 0 | Status: COMPLETED | OUTPATIENT
Start: 2025-04-07 | End: 2025-04-07

## 2025-04-07 RX ORDER — DOCUSATE SODIUM 100 MG/1
100 CAPSULE, LIQUID FILLED ORAL DAILY
Status: DISCONTINUED | OUTPATIENT
Start: 2025-04-07 | End: 2025-04-11 | Stop reason: HOSPADM

## 2025-04-07 RX ORDER — OXYCODONE AND ACETAMINOPHEN 10; 325 MG/1; MG/1
1 TABLET ORAL
Refills: 0 | Status: COMPLETED | OUTPATIENT
Start: 2025-04-07 | End: 2025-04-07

## 2025-04-07 RX ORDER — POLYETHYLENE GLYCOL 3350, SODIUM SULFATE, SODIUM CHLORIDE, POTASSIUM CHLORIDE, SODIUM ASCORBATE, AND ASCORBIC ACID 7.5-2.691G
KIT ORAL
Qty: 1 KIT | Refills: 0 | Status: ON HOLD | OUTPATIENT
Start: 2025-04-07 | End: 2025-04-11 | Stop reason: HOSPADM

## 2025-04-07 RX ORDER — ONDANSETRON HYDROCHLORIDE 2 MG/ML
4 INJECTION, SOLUTION INTRAVENOUS
Status: COMPLETED | OUTPATIENT
Start: 2025-04-07 | End: 2025-04-07

## 2025-04-07 RX ORDER — LIDOCAINE 50 MG/G
1 PATCH TOPICAL
Status: COMPLETED | OUTPATIENT
Start: 2025-04-07 | End: 2025-04-08

## 2025-04-07 RX ORDER — OXYCODONE AND ACETAMINOPHEN 10; 325 MG/1; MG/1
1 TABLET ORAL EVERY 6 HOURS PRN
Refills: 0 | Status: DISCONTINUED | OUTPATIENT
Start: 2025-04-07 | End: 2025-04-11 | Stop reason: HOSPADM

## 2025-04-07 RX ADMIN — DOCUSATE SODIUM 100 MG: 100 CAPSULE, LIQUID FILLED ORAL at 10:04

## 2025-04-07 RX ADMIN — ONDANSETRON 4 MG: 2 INJECTION INTRAMUSCULAR; INTRAVENOUS at 12:04

## 2025-04-07 RX ADMIN — MORPHINE SULFATE 4 MG: 4 INJECTION INTRAVENOUS at 12:04

## 2025-04-07 RX ADMIN — LIDOCAINE 1 PATCH: 50 PATCH TOPICAL at 12:04

## 2025-04-07 RX ADMIN — OXYCODONE AND ACETAMINOPHEN 1 TABLET: 10; 325 TABLET ORAL at 05:04

## 2025-04-07 RX ADMIN — IOHEXOL 100 ML: 350 INJECTION, SOLUTION INTRAVENOUS at 11:04

## 2025-04-07 RX ADMIN — OXYCODONE AND ACETAMINOPHEN 1 TABLET: 325; 10 TABLET ORAL at 10:04

## 2025-04-07 RX ADMIN — SODIUM CHLORIDE, POTASSIUM CHLORIDE, SODIUM LACTATE AND CALCIUM CHLORIDE 1000 ML: 600; 310; 30; 20 INJECTION, SOLUTION INTRAVENOUS at 11:04

## 2025-04-07 RX ADMIN — SODIUM SULFATE, POTASSIUM SULFATE, MAGNESIUM SULFATE 177 ML: 17.5; 3.13; 1.6 SOLUTION, CONCENTRATE ORAL at 05:04

## 2025-04-07 NOTE — FIRST PROVIDER EVALUATION
"Medical screening examination initiated.  I have conducted a focused provider triage encounter, findings are as follows:    Brief history of present illness:  64yoAAM w/constipation and back pain for "a while". Recently diagnosed with colon/liver masses on 3/20/25. Has not seen oncology yet. Here for low back pain. No bowel or bladder incontinence. No numbness/tingling or radiating symptoms.     Vitals:    04/07/25 0851   BP: 114/75   Pulse: 88   Resp: 16   Temp: 97.4 °F (36.3 °C)   SpO2: 100%   Weight: 55.1 kg (121 lb 7.6 oz)   Height: 6' (1.829 m)       Pertinent physical exam:  NAD.     Brief workup plan:  labs and imaging if indicated.     Preliminary workup initiated; this workup will be continued and followed by the physician or advanced practice provider that is assigned to the patient when roomed.  "

## 2025-04-07 NOTE — H&P
"[facility] Hospital Medicine History & Physical Examination       Patient Name: Lincoln Vizcarra  MRN: [id number]  Patient Class: IP- Inpatient   Admission Date: 4/7/2025   Admitting Physician: CHLOE Service   Length of Stay: 0  Attending Physician: Ashley Colorado MD  Primary Care Provider: Richelle, Primary Doctor  Face-to-Face encounter date: 04/07/2025  Code Status:full  Chief Complaint: Back Pain (Pt reports he was dx with colon cancer 3 weeks ago with "spots on his liver". Denies numbness tingling in legs or bowel and bladder incontinence. Pt also reports significant constipation as well. Pt has not been given apt with oncologist yet. Pt has had an 80 lb weight loss over the past 6 months.)      Screening for Social Drivers for health:  Patient screened for food insecurity, housing instability, transportation needs, utility difficulties, and interpersonal safety (select all that apply as identified as concern)  []Housing or Food  []Transportation Needs  []Utility Difficulties  []Interpersonal safety  [x]None      Patient information was obtained from patient, patient's family, past medical records and ER records.  ED records were reviewed in detail and documented below    HISTORY OF PRESENT ILLNESS:   Lincoln Vizcarra is a 64 y.o. male who  has no past medical history on file.. The patient presented to [facility] on 4/7/2025 with a primary complaint of radiating lower back pain moving down his spine and abdominal pain. Pain started on February and worse yesterday which made him present to the hospital as he never heard back regarding Gi follow up appointments outpatient. Patient  reporting constipation and 80 lb weight loss within the last year.Denies any incontinence.     Of note, patient stated that he was diagnosed with colon cancer 3 weeks ago that has spread to his liver and has been waiting to follow up with GI.      On arrival, afebrile, hemodynamically stable on room air.  Labs " with hemoglobin 12.  Elevated transaminases.    CT scan: Impression:     Circumferential, apple-core type malignant-appearing mass in the sigmoid colon.     Multiple liver metastasis appears slightly worse than the prior examination     Bone metastasis seen in the sacrum on the right side and the left 9th rib at the costovertebral junction.    Admitted to Hospital Medicine for further management.    PAST MEDICAL HISTORY:   History reviewed. No pertinent past medical history.    PAST SURGICAL HISTORY:   Reporting stent right lower extremity    ALLERGIES:   Patient has no known allergies.    FAMILY HISTORY:   Reviewed and negative    SOCIAL HISTORY:     Social History     Tobacco Use    Smoking status: Every Day     Current packs/day: 0.50     Average packs/day: 0.5 packs/day for 45.3 years (22.6 ttl pk-yrs)     Types: Cigarettes     Start date: 1980    Smokeless tobacco: Never   Substance Use Topics    Alcohol use: Not Currently     Comment: RARE        HOME MEDICATIONS:     Prior to Admission medications    Medication Sig Start Date End Date Taking? Authorizing Provider   polyethylene glycol (MIRALAX) 17 gram PwPk Take 17 g by mouth once daily. for 14 days 3/31/25 4/14/25  Peggy Chen PA   polyethylene glycol (MOVIPREP) 100-7.5-2.691 gram solution Take as directed prior to colonoscopy 4/7/25   Porsche Kaiser, RONALDP       REVIEW OF SYSTEMS:   Except as documented, all other systems reviewed and negative     PHYSICAL EXAM:     VITAL SIGNS: 24 HRS MIN & MAX LAST   Temp  Min: 97.4 °F (36.3 °C)  Max: 97.4 °F (36.3 °C) 97.4 °F (36.3 °C)   BP  Min: 106/66  Max: 122/75 106/66   Pulse  Min: 69  Max: 88  70   Resp  Min: 16  Max: 18 16   SpO2  Min: 100 %  Max: 100 % 100 %     General appearance:  Afebrile, cachectic  HENT: Atraumatic head. Moist mucous membranes of oral cavity.  Eyes: Normal extraocular movements.   Neck: Supple.   Lungs: Clear to auscultation bilaterally. No wheezing present.   Heart:  S1 and S2  present   Abdomen: Soft, generalized abdominal tenderness. Bowel sounds are normal.   MSK: No cyanosis, clubbing, or extremity edema.  Tenderness to palpation of back  Skin: No Rash.   Neuro:  Alert and oriented ,moving all extremities Psych/mental status: Appropriate mood and affect. Responds appropriately to questions.     LABS AND IMAGING:     Recent Labs   Lab 04/07/25  1136   WBC 8.19   RBC 3.81*   HGB 12.1*   HCT 36.8*   MCV 96.6*   MCH 31.8*   MCHC 32.9*   RDW 13.0      MPV 8.8       Recent Labs   Lab 04/07/25  1038   *   K 5.0      CO2 20*   BUN 26.9*   CREATININE 0.85   CALCIUM 10.1*   ALBUMIN 3.7   ALKPHOS 862*   *   *   BILITOT 1.0       Microbiology Results (last 7 days)       ** No results found for the last 168 hours. **             CT Chest Abdomen Pelvis With IV Contrast (XPD) NO Oral Contrast  Narrative: EXAMINATION:  CT CHEST ABDOMEN PELVIS WITH IV CONTRAST (XPD)    CLINICAL HISTORY:  metastatic malignancy, increased back/abdominal pain, eval for pathologic spine fx, bowel obstruction;    TECHNIQUE:  Low dose axial images, sagittal and coronal reformations were obtained from the thoracic inlet to the pubic symphysis following the IV and oral contrast administration.  Automatic exposure control is utilized to reduce patient radiation exposure.    COMPARISON:  03/20/2025    FINDINGS:  There are changes seen consistent with emphysema and COPD in the lungs bilaterally..  No mass is seen.  No nodule is seen.  No pleural thickening is seen.  No pleural effusion is seen.  No infiltrate is seen.    The thoracic aorta is normal in caliber..    No mediastinal adenopathy is seen.    The heart appears normal in size..    There are multiple too numerous to count liver metastasis seen.  Similar changes were seen previously.  Representative measurement is made of a lesion in segment 4 of the liver image 92 series 2.  It measures 4.5 x 4.9 cm on today's examination and previously  it measured 4.1 by 4.2 cm.  It appears minimally larger.    Some intrahepatic biliary dilatation is seen in the right lobe of the liver likely due to mechanical obstructive changes from the extensive liver metastasis.  The gallbladder appears unremarkable.    The spleen appears normal.  No splenic mass or lesion is seen.    The pancreas appears grossly unremarkable.  No pancreatic mass or lesion is seen.  No inflammation is seen.    No adrenal abnormality is seen.  No adrenal nodule is seen.    The kidneys are well perfused.  No hydronephrosis is seen.  No hydroureter is seen.  No retroperitoneal abnormality is seen..    There is a mass in the sigmoid colon.  It has a apple-core type appearance and appears slightly larger than the prior examination.    No free air is seen.  No free fluid is seen.    Urinary bladder appears unremarkable.    There is a slightly expansile lytic metastasis involving the sacrum on the right side.  It is causing some cortical destruction.  The lesion measures 2 cm x 2.6 cm as measured on image number 167 series 2.  There is a lytic bone metastasis involving the 9th rib on the left side at the costovertebral junction.  There is best seen on image number 65 series 2.  No other bone metastasis is seen.  Impression: Circumferential, apple-core type malignant-appearing mass in the sigmoid colon.    Multiple liver metastasis appears slightly worse than the prior examination    Bone metastasis seen in the sacrum on the right side and the left 9th rib at the costovertebral junction    This report was flagged in Epic as abnormal.    Electronically signed by: Giovanni Begum  Date:    04/07/2025  Time:    12:21      ASSESSMENT & PLAN:   Abdominal pain  Lower back pain  Metastatic colon cancer-Mets to liver, spine  Constipation  Transaminitis    Plan   Analgesics p.r.n.  Gastroenterology,Colorectal consult.No biopsy on chart .  Follow up on further recommendations  Stool softeners,laxatives as  needed.  Monitor LFTs.  No home meds.    VTE Prophylaxis: scds    Disposition-to be decided  __________________________________________________________________________  INPATIENT LIST OF MEDICATIONS     Scheduled Meds:   docusate sodium  100 mg Oral Daily    LIDOcaine  1 patch Transdermal ED 1 Time     Continuous Infusions:  PRN Meds:.        Ashley Colorado MD   04/07/2025

## 2025-04-07 NOTE — Clinical Note
Diagnosis: Intractable pain [567124]   Future Attending Provider: SOPHIA DELGADO [[id number]]   Admit to which facility:: [facility] [[id number]]   Reason for IP Medical Treatment  (Clinical interventions that can only be accomplished in the IP setting? ) :: IV fluids, GI consult   Plans for Post-Acute care--if anticipated (pick the single best option):: A. No post acute care anticipated at this time
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
## 2025-04-07 NOTE — ED PROVIDER NOTES
"Encounter Date: 4/7/2025    SCRIBE #1 NOTE: I, Maggie Joel, am scribing for, and in the presence of,  Luli Boateng MD. I have scribed the following portions of the note - Other sections scribed: HPI, ROS, PE.       History     Chief Complaint   Patient presents with    Back Pain     Pt reports he was dx with colon cancer 3 weeks ago with "spots on his liver". Denies numbness tingling in legs or bowel and bladder incontinence. Pt also reports significant constipation as well. Pt has not been given apt with oncologist yet. Pt has had an 80 lb weight loss over the past 6 months.     64-year-old male presents to the ED for abdominal pain radiating to lower back beginning 3 weeks ago. Patient reports 80 pound weight loss within the last year, 10 pound weight loss in the last month. He reports appetite loss. He reports presenting to [facility] 3 weeks ago and diagnosed with colon cancer with metastasis to his liver. He reports he was referred to GI with no word back yet. He reports his back pain feels like its moving down his spine. He reports constipation but is able to pass gas.. He denies fever or bladder incontinence.    The history is provided by the patient. No  was used.     Review of patient's allergies indicates:  No Known Allergies  History reviewed. No pertinent past medical history.  History reviewed. No pertinent surgical history.  No family history on file.  Social History[1]  Review of Systems   Constitutional:  Positive for appetite change and unexpected weight change. Negative for fever.   Gastrointestinal:  Positive for abdominal pain and constipation.   Musculoskeletal:  Positive for back pain.       Physical Exam     Initial Vitals [04/07/25 0851]   BP Pulse Resp Temp SpO2   114/75 88 16 97.4 °F (36.3 °C) 100 %      MAP       --         Physical Exam    Nursing note and vitals reviewed.  Constitutional: He appears well-developed. He appears cachectic.   HENT:   Head: Normocephalic and " atraumatic. Mouth/Throat: Oropharynx is clear and moist.   Eyes: No scleral icterus.   Neck: Neck supple.   Normal range of motion.  Cardiovascular:  Normal rate and regular rhythm.           Pulmonary/Chest: Breath sounds normal. No respiratory distress.   Abdominal: Abdomen is soft. He exhibits no distension. There is generalized abdominal tenderness.   Musculoskeletal:      Cervical back: Normal range of motion and neck supple.      Comments: No point vertebral tenderness.  Lower lumbar/sacral back tenderness to palpation.     Neurological: He is alert and oriented to person, place, and time. GCS score is 15.   Skin: Skin is warm and dry.         ED Course   Procedures  Labs Reviewed   COMPREHENSIVE METABOLIC PANEL - Abnormal       Result Value    Sodium 134 (*)     Potassium 5.0      Chloride 102      CO2 20 (*)     Glucose 97      Blood Urea Nitrogen 26.9 (*)     Creatinine 0.85      Calcium 10.1 (*)     Protein Total 8.9 (*)     Albumin 3.7      Globulin 5.2 (*)     Albumin/Globulin Ratio 0.7 (*)     Bilirubin Total 1.0       (*)      (*)      (*)     eGFR >60      Anion Gap 12.0      BUN/Creatinine Ratio 32     CBC WITH DIFFERENTIAL - Abnormal    WBC 8.19      RBC 3.81 (*)     Hgb 12.1 (*)     Hct 36.8 (*)     MCV 96.6 (*)     MCH 31.8 (*)     MCHC 32.9 (*)     RDW 13.0      Platelet 348      MPV 8.8      Neut % 64.6      Lymph % 17.9      Mono % 16.0      Eos % 0.5      Basophil % 0.6      Imm Grans % 0.4      Neut # 5.29      Lymph # 1.47      Mono # 1.31 (*)     Eos # 0.04      Baso # 0.05      Imm Gran # 0.03      NRBC% 0.0     CBC W/ AUTO DIFFERENTIAL    Narrative:     The following orders were created for panel order CBC auto differential.  Procedure                               Abnormality         Status                     ---------                               -----------         ------                     CBC with Differential[[id number]]       Abnormal            Final result                  Please view results for these tests on the individual orders.   URINALYSIS, REFLEX TO URINE CULTURE          Imaging Results               CT Chest Abdomen Pelvis With IV Contrast (XPD) NO Oral Contrast (Final result)  Result time 04/07/25 12:21:40      Final result by Suzanne Begum MD (04/07/25 12:21:40)                   Impression:      Circumferential, apple-core type malignant-appearing mass in the sigmoid colon.    Multiple liver metastasis appears slightly worse than the prior examination    Bone metastasis seen in the sacrum on the right side and the left 9th rib at the costovertebral junction    This report was flagged in Epic as abnormal.      Electronically signed by: Giovanni Begum  Date:    04/07/2025  Time:    12:21               Narrative:    EXAMINATION:  CT CHEST ABDOMEN PELVIS WITH IV CONTRAST (XPD)    CLINICAL HISTORY:  metastatic malignancy, increased back/abdominal pain, eval for pathologic spine fx, bowel obstruction;    TECHNIQUE:  Low dose axial images, sagittal and coronal reformations were obtained from the thoracic inlet to the pubic symphysis following the IV and oral contrast administration.  Automatic exposure control is utilized to reduce patient radiation exposure.    COMPARISON:  03/20/2025    FINDINGS:  There are changes seen consistent with emphysema and COPD in the lungs bilaterally..  No mass is seen.  No nodule is seen.  No pleural thickening is seen.  No pleural effusion is seen.  No infiltrate is seen.    The thoracic aorta is normal in caliber..    No mediastinal adenopathy is seen.    The heart appears normal in size..    There are multiple too numerous to count liver metastasis seen.  Similar changes were seen previously.  Representative measurement is made of a lesion in segment 4 of the liver image 92 series 2.  It measures 4.5 x 4.9 cm on today's examination and previously it measured 4.1 by 4.2 cm.  It appears minimally larger.    Some  intrahepatic biliary dilatation is seen in the right lobe of the liver likely due to mechanical obstructive changes from the extensive liver metastasis.  The gallbladder appears unremarkable.    The spleen appears normal.  No splenic mass or lesion is seen.    The pancreas appears grossly unremarkable.  No pancreatic mass or lesion is seen.  No inflammation is seen.    No adrenal abnormality is seen.  No adrenal nodule is seen.    The kidneys are well perfused.  No hydronephrosis is seen.  No hydroureter is seen.  No retroperitoneal abnormality is seen..    There is a mass in the sigmoid colon.  It has a apple-core type appearance and appears slightly larger than the prior examination.    No free air is seen.  No free fluid is seen.    Urinary bladder appears unremarkable.    There is a slightly expansile lytic metastasis involving the sacrum on the right side.  It is causing some cortical destruction.  The lesion measures 2 cm x 2.6 cm as measured on image number 167 series 2.  There is a lytic bone metastasis involving the 9th rib on the left side at the costovertebral junction.  There is best seen on image number 65 series 2.  No other bone metastasis is seen.                                       Medications   docusate sodium capsule 100 mg (100 mg Oral Given 4/7/25 1026)   LIDOcaine 5 % patch 1 patch (1 patch Transdermal Patch Applied 4/7/25 1240)   oxyCODONE-acetaminophen  mg per tablet 1 tablet (1 tablet Oral Given 4/7/25 1026)   lactated ringers bolus 1,000 mL (0 mLs Intravenous Stopped 4/7/25 1240)   iohexoL (OMNIPAQUE 350) injection 100 mL (100 mLs Intravenous Given 4/7/25 1150)   morphine injection 4 mg (4 mg Intravenous Given 4/7/25 1239)   ondansetron injection 4 mg (4 mg Intravenous Given 4/7/25 1240)     Medical Decision Making  Problems Addressed:  Cancer, metastatic to bone: acute illness or injury  Constipation, unspecified constipation type: chronic illness or injury with exacerbation,  progression, or side effects of treatment  Intractable pain: acute illness or injury  Metastatic cancer to liver: chronic illness or injury with severe exacerbation, progression, or side effects of treatment that poses a threat to life or bodily functions    Amount and/or Complexity of Data Reviewed  Labs: ordered.  Radiology: ordered.    Risk  Prescription drug management.  Decision regarding hospitalization.    ED assessment:    Mr. Vizcarra presented for evaluation of increased abdominal pain, back pain, continued weight loss.  Recent workup at another facility with suspected: Mass with liver metastases, patient states has not been contacted to schedule follow up appointment yet.    Differential diagnosis (including but not limited to):   Ileus, bowel obstruction, colonic mass, intra-abdominal metastatic disease, acute kidney injury, electrolyte derangements, dehydration, pathologic fracture, osseous metastatic disease    ED management:   Laboratory studies with new LFT elevation, increasing alcohol and phosphatase levels as well.  Hypercalcemia noted.  No significant leukocytosis or acute anemia.  Imaging obtained demonstrates further progression of metastatic lesions, circumferential sigmoid lesion, likely primary malignancy.  Additionally with bony Mets to the sacrum and rib.  Analgesics provided in the ED, pain still uncontrolled at this time.  Discussed with GI who agrees to see in consultation, possible endoscopic evaluation; however, get with extent of involvement, may benefit from palliative care involvement.    My independent radiology interpretation:   CT chest, abdomen, and pelvis: Extensive liver metastases, circumferential signal on mass, no significant axial skeleton fracture, no free air      Amount and/or Complexity of Data Reviewed  Independent historian: none   Summary of history:   External data reviewed: notes from previous ED visits and prior imaging  Summary of data reviewed:  ED visits  3/20 and 03/31/2025.  Reported back pain, abdominal pain, weight loss at that time.  Laboratory studies with elevated alkaline phosphatase, minimally elevated AST, concentrated appearing urinalysis my otherwise unremarkable.  CT abdomen and pelvis obtained demonstrated mass of the sigmoid colon compatible with malignancy with a extensive hepatic metastatic disease.  On 2nd ED visit, similar complaints however voice concern at that time that he had not yet been scheduled for additional testing.  Tumor markers obtained with elevated CA 19 9, elevated CEA.  GI referral sent.  Patient prescribed MiraLax, hydrocodone at that time.    Risk and benefits of testing: discussed   Labs: ordered and reviewed  Radiology: ordered and independent interpretation performed (see above or ED course)    Discussion of management or test interpretation with external provider(s): discussed with hospitalist physician and discussed with Gastroenterology consultant   Summary of discussion:  Discussed with GI PA who evaluated the patient at the bedside, agreed plan colonoscopy tomorrow.  Discussed with the hospitalist who accepts for admit    Risk  Prescription drug management   Parenteral controlled substances   Decision regarding hospitalization  Shared decision making     Critical Care  none    ILuli MD personally performed the history, PE, MDM, and procedures as documented above and agree with the scribe's documentation.           Scribe Attestation:   Scribe #1: I performed the above scribed service and the documentation accurately describes the services I performed. I attest to the accuracy of the note.    Attending Attestation:           Physician Attestation for Scribe:  Physician Attestation Statement for Scribe #1: ILuli MD, reviewed documentation, as scribed by Maggie Maldonado in my presence, and it is both accurate and complete.             ED Course as of 04/07/25 1242   Mon Apr 07, 2025   1240 Discussed with  Hospitalist [MB]      ED Course User Index  [MB] Maggie Maldonado                           Clinical Impression:  Final diagnoses:  [R52] Intractable pain  [C78.7] Metastatic cancer to liver  [K59.00] Constipation, unspecified constipation type  [C79.51] Cancer, metastatic to bone          ED Disposition Condition    Admit                     [1]   Social History  Tobacco Use    Smoking status: Every Day     Current packs/day: 0.50     Average packs/day: 0.5 packs/day for 45.3 years (22.6 ttl pk-yrs)     Types: Cigarettes     Start date: 1980    Smokeless tobacco: Never   Substance Use Topics    Alcohol use: Not Currently     Comment: RARE    Drug use: Not Currently        Luli Boateng MD  04/07/25 2017

## 2025-04-08 ENCOUNTER — ANESTHESIA EVENT (OUTPATIENT)
Dept: ENDOSCOPY | Facility: HOSPITAL | Age: 65
End: 2025-04-08
Payer: COMMERCIAL

## 2025-04-08 ENCOUNTER — ANESTHESIA (OUTPATIENT)
Dept: ENDOSCOPY | Facility: HOSPITAL | Age: 65
End: 2025-04-08
Payer: COMMERCIAL

## 2025-04-08 PROBLEM — E43 SEVERE MALNUTRITION: Status: ACTIVE | Noted: 2025-04-08

## 2025-04-08 LAB
ALBUMIN SERPL-MCNC: 2.9 G/DL (ref 3.4–4.8)
ALBUMIN/GLOB SERPL: 0.9 RATIO (ref 1.1–2)
ALP SERPL-CCNC: 755 UNIT/L (ref 40–150)
ALT SERPL-CCNC: 203 UNIT/L (ref 0–55)
ANION GAP SERPL CALC-SCNC: 11 MEQ/L
AST SERPL-CCNC: 219 UNIT/L (ref 11–45)
BACTERIA #/AREA URNS AUTO: ABNORMAL /HPF
BASOPHILS # BLD AUTO: 0.03 X10(3)/MCL
BASOPHILS NFR BLD AUTO: 0.3 %
BILIRUB SERPL-MCNC: 0.9 MG/DL
BILIRUB UR QL STRIP.AUTO: NEGATIVE
BUN SERPL-MCNC: 20.1 MG/DL (ref 8.4–25.7)
CALCIUM SERPL-MCNC: 8.6 MG/DL (ref 8.8–10)
CHLORIDE SERPL-SCNC: 103 MMOL/L (ref 98–107)
CLARITY UR: CLEAR
CO2 SERPL-SCNC: 22 MMOL/L (ref 23–31)
COLOR UR AUTO: YELLOW
CREAT SERPL-MCNC: 0.68 MG/DL (ref 0.72–1.25)
CREAT/UREA NIT SERPL: 30
EOSINOPHIL # BLD AUTO: 0.08 X10(3)/MCL (ref 0–0.9)
EOSINOPHIL NFR BLD AUTO: 0.8 %
ERYTHROCYTE [DISTWIDTH] IN BLOOD BY AUTOMATED COUNT: 13 % (ref 11.5–17)
GFR SERPLBLD CREATININE-BSD FMLA CKD-EPI: >60 ML/MIN/1.73/M2
GLOBULIN SER-MCNC: 3.4 GM/DL (ref 2.4–3.5)
GLUCOSE SERPL-MCNC: 87 MG/DL (ref 82–115)
GLUCOSE UR QL STRIP: NORMAL
HCT VFR BLD AUTO: 31.3 % (ref 42–52)
HGB BLD-MCNC: 11.1 G/DL (ref 14–18)
HGB UR QL STRIP: NEGATIVE
IMM GRANULOCYTES # BLD AUTO: 0.03 X10(3)/MCL (ref 0–0.04)
IMM GRANULOCYTES NFR BLD AUTO: 0.3 %
KETONES UR QL STRIP: NEGATIVE
LEUKOCYTE ESTERASE UR QL STRIP: NEGATIVE
LYMPHOCYTES # BLD AUTO: 1.08 X10(3)/MCL (ref 0.6–4.6)
LYMPHOCYTES NFR BLD AUTO: 11.4 %
MCH RBC QN AUTO: 32.9 PG (ref 27–31)
MCHC RBC AUTO-ENTMCNC: 35.5 G/DL (ref 33–36)
MCV RBC AUTO: 92.9 FL (ref 80–94)
MONOCYTES # BLD AUTO: 1.42 X10(3)/MCL (ref 0.1–1.3)
MONOCYTES NFR BLD AUTO: 15 %
MUCOUS THREADS URNS QL MICRO: ABNORMAL /LPF
NEUTROPHILS # BLD AUTO: 6.85 X10(3)/MCL (ref 2.1–9.2)
NEUTROPHILS NFR BLD AUTO: 72.2 %
NITRITE UR QL STRIP: NEGATIVE
NRBC BLD AUTO-RTO: 0 %
PH UR STRIP: 6 [PH]
PLATELET # BLD AUTO: 398 X10(3)/MCL (ref 130–400)
PMV BLD AUTO: 9.2 FL (ref 7.4–10.4)
POTASSIUM SERPL-SCNC: 4.4 MMOL/L (ref 3.5–5.1)
PROT SERPL-MCNC: 6.3 GM/DL (ref 5.8–7.6)
PROT UR QL STRIP: ABNORMAL
RBC # BLD AUTO: 3.37 X10(6)/MCL (ref 4.7–6.1)
RBC #/AREA URNS AUTO: ABNORMAL /HPF
SODIUM SERPL-SCNC: 136 MMOL/L (ref 136–145)
SP GR UR STRIP.AUTO: 1.04 (ref 1–1.03)
SQUAMOUS #/AREA URNS LPF: ABNORMAL /HPF
UROBILINOGEN UR STRIP-ACNC: 8
WBC # BLD AUTO: 9.49 X10(3)/MCL (ref 4.5–11.5)
WBC #/AREA URNS AUTO: ABNORMAL /HPF

## 2025-04-08 PROCEDURE — 25000003 PHARM REV CODE 250: Performed by: INTERNAL MEDICINE

## 2025-04-08 PROCEDURE — 36415 COLL VENOUS BLD VENIPUNCTURE: CPT | Performed by: INTERNAL MEDICINE

## 2025-04-08 PROCEDURE — 99223 1ST HOSP IP/OBS HIGH 75: CPT | Mod: ,,, | Performed by: INTERNAL MEDICINE

## 2025-04-08 PROCEDURE — 85025 COMPLETE CBC W/AUTO DIFF WBC: CPT | Performed by: INTERNAL MEDICINE

## 2025-04-08 PROCEDURE — 37000009 HC ANESTHESIA EA ADD 15 MINS: Performed by: STUDENT IN AN ORGANIZED HEALTH CARE EDUCATION/TRAINING PROGRAM

## 2025-04-08 PROCEDURE — 0DBN8ZX EXCISION OF SIGMOID COLON, VIA NATURAL OR ARTIFICIAL OPENING ENDOSCOPIC, DIAGNOSTIC: ICD-10-PCS | Performed by: INTERNAL MEDICINE

## 2025-04-08 PROCEDURE — 45331 SIGMOIDOSCOPY AND BIOPSY: CPT | Performed by: STUDENT IN AN ORGANIZED HEALTH CARE EDUCATION/TRAINING PROGRAM

## 2025-04-08 PROCEDURE — 37000008 HC ANESTHESIA 1ST 15 MINUTES: Performed by: STUDENT IN AN ORGANIZED HEALTH CARE EDUCATION/TRAINING PROGRAM

## 2025-04-08 PROCEDURE — 63600175 PHARM REV CODE 636 W HCPCS: Performed by: NURSE ANESTHETIST, CERTIFIED REGISTERED

## 2025-04-08 PROCEDURE — 25000003 PHARM REV CODE 250: Performed by: NURSE ANESTHETIST, CERTIFIED REGISTERED

## 2025-04-08 PROCEDURE — 80053 COMPREHEN METABOLIC PANEL: CPT | Performed by: INTERNAL MEDICINE

## 2025-04-08 PROCEDURE — 27201423 OPTIME MED/SURG SUP & DEVICES STERILE SUPPLY: Performed by: STUDENT IN AN ORGANIZED HEALTH CARE EDUCATION/TRAINING PROGRAM

## 2025-04-08 PROCEDURE — 25000003 PHARM REV CODE 250: Performed by: PHYSICIAN ASSISTANT

## 2025-04-08 PROCEDURE — 45335 SIGMOIDOSCOPY W/SUBMUC INJ: CPT | Performed by: STUDENT IN AN ORGANIZED HEALTH CARE EDUCATION/TRAINING PROGRAM

## 2025-04-08 PROCEDURE — 11000001 HC ACUTE MED/SURG PRIVATE ROOM

## 2025-04-08 PROCEDURE — 81001 URINALYSIS AUTO W/SCOPE: CPT | Performed by: EMERGENCY MEDICINE

## 2025-04-08 RX ORDER — PROPOFOL 10 MG/ML
VIAL (ML) INTRAVENOUS
Status: DISCONTINUED | OUTPATIENT
Start: 2025-04-08 | End: 2025-04-08

## 2025-04-08 RX ORDER — PHENYLEPHRINE HCL IN 0.9% NACL 1 MG/10 ML
SYRINGE (ML) INTRAVENOUS
Status: DISCONTINUED | OUTPATIENT
Start: 2025-04-08 | End: 2025-04-08

## 2025-04-08 RX ORDER — SODIUM CHLORIDE, SODIUM GLUCONATE, SODIUM ACETATE, POTASSIUM CHLORIDE AND MAGNESIUM CHLORIDE 30; 37; 368; 526; 502 MG/100ML; MG/100ML; MG/100ML; MG/100ML; MG/100ML
INJECTION, SOLUTION INTRAVENOUS CONTINUOUS
OUTPATIENT
Start: 2025-04-08 | End: 2025-05-08

## 2025-04-08 RX ORDER — GLUCAGON 1 MG
1 KIT INJECTION
OUTPATIENT
Start: 2025-04-08

## 2025-04-08 RX ORDER — ONDANSETRON 4 MG/1
8 TABLET, ORALLY DISINTEGRATING ORAL EVERY 6 HOURS PRN
OUTPATIENT
Start: 2025-04-08

## 2025-04-08 RX ORDER — LIDOCAINE HYDROCHLORIDE 10 MG/ML
1 INJECTION, SOLUTION EPIDURAL; INFILTRATION; INTRACAUDAL; PERINEURAL ONCE
OUTPATIENT
Start: 2025-04-08 | End: 2025-04-08

## 2025-04-08 RX ORDER — LIDOCAINE HYDROCHLORIDE 20 MG/ML
INJECTION INTRAVENOUS
Status: DISCONTINUED | OUTPATIENT
Start: 2025-04-08 | End: 2025-04-08

## 2025-04-08 RX ORDER — ONDANSETRON HYDROCHLORIDE 2 MG/ML
4 INJECTION, SOLUTION INTRAVENOUS DAILY PRN
OUTPATIENT
Start: 2025-04-08

## 2025-04-08 RX ORDER — PHENYLEPHRINE HCL IN 0.9% NACL 1 MG/10 ML
SYRINGE (ML) INTRAVENOUS
Status: COMPLETED
Start: 2025-04-08 | End: 2025-04-08

## 2025-04-08 RX ORDER — PROCHLORPERAZINE EDISYLATE 5 MG/ML
5 INJECTION INTRAMUSCULAR; INTRAVENOUS EVERY 30 MIN PRN
OUTPATIENT
Start: 2025-04-08

## 2025-04-08 RX ORDER — LIDOCAINE HYDROCHLORIDE 20 MG/ML
INJECTION, SOLUTION EPIDURAL; INFILTRATION; INTRACAUDAL; PERINEURAL
Status: DISPENSED
Start: 2025-04-08 | End: 2025-04-08

## 2025-04-08 RX ORDER — PROPOFOL 10 MG/ML
VIAL (ML) INTRAVENOUS
Status: COMPLETED
Start: 2025-04-08 | End: 2025-04-08

## 2025-04-08 RX ORDER — IPRATROPIUM BROMIDE AND ALBUTEROL SULFATE 2.5; .5 MG/3ML; MG/3ML
3 SOLUTION RESPIRATORY (INHALATION)
OUTPATIENT
Start: 2025-04-08

## 2025-04-08 RX ADMIN — OXYCODONE AND ACETAMINOPHEN 1 TABLET: 10; 325 TABLET ORAL at 01:04

## 2025-04-08 RX ADMIN — SODIUM CHLORIDE, SODIUM GLUCONATE, SODIUM ACETATE, POTASSIUM CHLORIDE AND MAGNESIUM CHLORIDE: 526; 502; 368; 37; 30 INJECTION, SOLUTION INTRAVENOUS at 10:04

## 2025-04-08 RX ADMIN — PROPOFOL 50 MG: 10 INJECTION, EMULSION INTRAVENOUS at 11:04

## 2025-04-08 RX ADMIN — OXYCODONE AND ACETAMINOPHEN 1 TABLET: 10; 325 TABLET ORAL at 12:04

## 2025-04-08 RX ADMIN — LIDOCAINE HYDROCHLORIDE 100 MG: 20 INJECTION INTRAVENOUS at 11:04

## 2025-04-08 RX ADMIN — Medication 100 MCG: at 11:04

## 2025-04-08 RX ADMIN — SODIUM SULFATE, POTASSIUM SULFATE, MAGNESIUM SULFATE 177 ML: 17.5; 3.13; 1.6 SOLUTION, CONCENTRATE ORAL at 05:04

## 2025-04-08 RX ADMIN — OXYCODONE AND ACETAMINOPHEN 1 TABLET: 10; 325 TABLET ORAL at 08:04

## 2025-04-08 RX ADMIN — SENNOSIDES AND DOCUSATE SODIUM 1 TABLET: 50; 8.6 TABLET ORAL at 08:04
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
## 2025-04-08 NOTE — ANESTHESIA POSTPROCEDURE EVALUATION
Anesthesia Post Evaluation    Patient: Lincoln Vizcarra    Procedure(s) Performed: Procedure(s) (LRB):  COLON (N/A)    Final Anesthesia Type: general      Patient location during evaluation: floor  Patient participation: Yes- Able to Participate  Level of consciousness: awake and alert  Post-procedure vital signs: reviewed and stable  Pain management: adequate  Airway patency: patent    PONV status at discharge: No PONV  Anesthetic complications: no      Cardiovascular status: blood pressure returned to baseline  Respiratory status: spontaneous ventilation and room air  Hydration status: euvolemic  Follow-up not needed.              Vitals Value Taken Time   BP 99/68 04/08/25 11:50   Temp  04/08/25 12:58   Pulse 79 04/08/25 11:50   Resp 14 04/08/25 11:50   SpO2 100 % 04/08/25 11:50   Vitals shown include unfiled device data.      No case tracking events are documented in the log.      Pain/Ravindra Score: Pain Rating Prior to Med Admin: 5 (4/8/2025 12:43 PM)  Pain Rating Post Med Admin: 4 (4/8/2025  2:30 AM)  Ravindra Score: 9 (4/8/2025 11:22 AM)

## 2025-04-08 NOTE — PROGRESS NOTES
[facility]  Hospital Medicine Progress Note        Chief Complaint: Inpatient Follow-up     HPI: Lincoln Vizcarra is a 64 y.o. male who  has no past medical history on file.. The patient presented to [facility] on 4/7/2025 with a primary complaint of radiating lower back pain moving down his spine and abdominal pain. Pain started on February and worse yesterday which made him present to the hospital as he never heard back regarding Gi follow up appointments outpatient. Patient  reporting he has been dealing with constipation and 80 lb weight loss within the last year.Denies any incontinence.      Of note, patient stated that he was diagnosed with colon cancer 3 weeks ago that has spread to his liver and has been waiting to follow up with GI.       On arrival, afebrile, hemodynamically stable on room air.  Labs with hemoglobin 12.  Elevated transaminases.     CT scan: Impression:     Circumferential, apple-core type malignant-appearing mass in the sigmoid colon.     Multiple liver metastasis appears slightly worse than the prior examination     Bone metastasis seen in the sacrum on the right side and the left 9th rib at the costovertebral junction.     Admitted to Hospital Medicine for further management.    Interval Hx:   GI was consulted.  Taking to colonoscopy.  Family/friend at bedside .  Patient without any symptoms of pain today.  Chart was reviewed, afebrile, hemodynamically stable, most recent lab work was reviewed.    Objective/physical exam:  General: In no acute distress, afebrile  Chest: Clear to auscultation bilaterally  Heart:  +S1, S2, normal rate  Abdomen: Soft, nontender, BS +  MSK: Warm, no lower extremity edema, no clubbing or cyanosis  Neurologic: Alert and oriented x4, Cranial nerve II-XII intact, Strength 5/5 in all 4 extremities    VITAL SIGNS: 24 HRS MIN & MAX LAST   Temp  Min: 97.6 °F (36.4 °C)  Max: 98.8 °F (37.1 °C) 98.6 °F (37 °C)   BP  Min: 86/51  Max: 119/68  109/69   Pulse  Min: 70  Max: 86  75   Resp  Min: 16  Max: 21 20   SpO2  Min: 98 %  Max: 100 % 100 %     I have reviewed the following labs:  Recent Labs   Lab 04/07/25  1136 04/08/25  0305   WBC 8.19 9.49   RBC 3.81* 3.37*   HGB 12.1* 11.1*   HCT 36.8* 31.3*   MCV 96.6* 92.9   MCH 31.8* 32.9*   MCHC 32.9* 35.5   RDW 13.0 13.0    398   MPV 8.8 9.2     Recent Labs   Lab 04/07/25  1038 04/08/25  0305   * 136   K 5.0 4.4    103   CO2 20* 22*   BUN 26.9* 20.1   CREATININE 0.85 0.68*   CALCIUM 10.1* 8.6*   ALBUMIN 3.7 2.9*   ALKPHOS 862* 755*   * 203*   * 219*   BILITOT 1.0 0.9     Microbiology Results (last 7 days)       ** No results found for the last 168 hours. **             See below for Radiology    Assessment/Plan:  Abdominal pain  Lower back pain  Metastatic colon cancer-Mets to liver, spine  Constipation  Transaminitis, suspect from mets     Plan   Analgesics p.r.n.  Gastroenterology on board,planning for colonoscopy today.  Follow up on biopsies   May also need colorectal surgery to be consulted  Monitor bowel movements, Stool softeners,laxatives as needed.  Monitor LFTs.  No home meds.    VTE prophylaxis: scds      Anticipated discharge and Disposition:   To be decided      All diagnosis and differential diagnosis have been reviewed; assessment and plan has been documented; I have personally reviewed the labs and test results that are presently available; I have reviewed the patients medication list; I have reviewed the consulting providers response and recommendations. I have reviewed or attempted to review medical records based upon their availability    All of the patient's questions have been  addressed and answered. Patient's is agreeable to the above stated plan. I will continue to monitor closely and make adjustments to medical management as needed.    Portions of this note dictated using EMR integrated voice recognition software, and may be subject to voice recognition  errors not corrected at proofreading. Please contact writer for clarification if needed.   _____________________________________________________________________    Malnutrition Status:  Nutrition consulted. Most recent weight and BMI monitored-     Measurements:  Wt Readings from Last 1 Encounters:   04/07/25 55.1 kg (121 lb 7.6 oz)   Body mass index is 16.47 kg/m².    Patient has been screened and assessed by RD.    Malnutrition Type:  Context:    Level:      Malnutrition Characteristic Summary:       Interventions/Recommendations (treatment strategy):        Scheduled Med:   docusate sodium  100 mg Oral Daily    LIDOcaine (PF) 20 mg/mL (2%)        polyethylene glycol  17 g Oral Daily    senna-docusate  1 tablet Oral BID      Continuous Infusions:     PRN Meds:    Current Facility-Administered Medications:     LIDOcaine (PF) 20 mg/mL (2%), , ,     oxyCODONE-acetaminophen, 1 tablet, Oral, Q6H PRN     Radiology:  I have personally reviewed the following imaging and agree with the radiologist.     CT Chest Abdomen Pelvis With IV Contrast (XPD) NO Oral Contrast  Narrative: EXAMINATION:  CT CHEST ABDOMEN PELVIS WITH IV CONTRAST (XPD)    CLINICAL HISTORY:  metastatic malignancy, increased back/abdominal pain, eval for pathologic spine fx, bowel obstruction;    TECHNIQUE:  Low dose axial images, sagittal and coronal reformations were obtained from the thoracic inlet to the pubic symphysis following the IV and oral contrast administration.  Automatic exposure control is utilized to reduce patient radiation exposure.    COMPARISON:  03/20/2025    FINDINGS:  There are changes seen consistent with emphysema and COPD in the lungs bilaterally..  No mass is seen.  No nodule is seen.  No pleural thickening is seen.  No pleural effusion is seen.  No infiltrate is seen.    The thoracic aorta is normal in caliber..    No mediastinal adenopathy is seen.    The heart appears normal in size..    There are multiple too numerous to count  liver metastasis seen.  Similar changes were seen previously.  Representative measurement is made of a lesion in segment 4 of the liver image 92 series 2.  It measures 4.5 x 4.9 cm on today's examination and previously it measured 4.1 by 4.2 cm.  It appears minimally larger.    Some intrahepatic biliary dilatation is seen in the right lobe of the liver likely due to mechanical obstructive changes from the extensive liver metastasis.  The gallbladder appears unremarkable.    The spleen appears normal.  No splenic mass or lesion is seen.    The pancreas appears grossly unremarkable.  No pancreatic mass or lesion is seen.  No inflammation is seen.    No adrenal abnormality is seen.  No adrenal nodule is seen.    The kidneys are well perfused.  No hydronephrosis is seen.  No hydroureter is seen.  No retroperitoneal abnormality is seen..    There is a mass in the sigmoid colon.  It has a apple-core type appearance and appears slightly larger than the prior examination.    No free air is seen.  No free fluid is seen.    Urinary bladder appears unremarkable.    There is a slightly expansile lytic metastasis involving the sacrum on the right side.  It is causing some cortical destruction.  The lesion measures 2 cm x 2.6 cm as measured on image number 167 series 2.  There is a lytic bone metastasis involving the 9th rib on the left side at the costovertebral junction.  There is best seen on image number 65 series 2.  No other bone metastasis is seen.  Impression: Circumferential, apple-core type malignant-appearing mass in the sigmoid colon.    Multiple liver metastasis appears slightly worse than the prior examination    Bone metastasis seen in the sacrum on the right side and the left 9th rib at the costovertebral junction    This report was flagged in Epic as abnormal.    Electronically signed by: Giovanni Begum  Date:    04/07/2025  Time:    12:21      Ashley Colorado MD  Department of Hospital Medicine   [facility]  [facility]   04/08/2025

## 2025-04-08 NOTE — TRANSFER OF CARE
Anesthesia Transfer of Care Note    Patient: Lincoln Vizcarra    Procedure(s) Performed: Procedure(s) (LRB):  COLON (N/A)    Patient location: GI    Anesthesia Type: general    Transport from OR: Transported from OR on room air with adequate spontaneous ventilation    Post pain: adequate analgesia    Post assessment: no apparent anesthetic complications    Post vital signs: stable    Level of consciousness: responds to stimulation    Nausea/Vomiting: no nausea/vomiting    Complications: none    Transfer of care protocol was followed        
Laura Kaur)  2018 16:17:58

## 2025-04-08 NOTE — PROVATION PATIENT INSTRUCTIONS
Discharge Summary/Instructions after an Endoscopic Procedure  Patient Name: Lincoln Vizcarra  Patient MRN: [id number]  Patient YOB: 1960 Tuesday, April 8, 2025  Lincoln Alvarenga MD  Dear patient,  As a result of recent federal legislation (The Federal Cures Act), you may   receive lab or pathology results from your procedure in your [facility]   account before your physician is able to contact you. Your physician or   their representative will relay the results to you with their   recommendations at their soonest availability.  Thank you,  RESTRICTIONS:  During your procedure today, you received medications for sedation.  These   medications may affect your judgment, balance and coordination.  Therefore,   for 24 hours, you have the following restrictions:   - DO NOT drive a car, operate machinery, make legal/financial decisions,   sign important papers or drink alcohol.    ACTIVITY:  Today: no heavy lifting, straining or running due to procedural   sedation/anesthesia.  The following day: return to full activity including work.  DIET:  Eat and drink normally unless instructed otherwise.     TREATMENT FOR COMMON SIDE EFFECTS:  - Mild abdominal pain, nausea, belching, bloating or excessive gas:  rest,   eat lightly and use a heating pad.  - Sore Throat: treat with throat lozenges and/or gargle with warm salt   water.  - Because air was used during the procedure, expelling large amounts of air   from your rectum or belching is normal.  - If a bowel prep was taken, you may not have a bowel movement for 1-3 days.    This is normal.  SYMPTOMS TO WATCH FOR AND REPORT TO YOUR PHYSICIAN:  1. Abdominal pain or bloating, other than gas cramps.  2. Chest pain.  3. Back pain.  4. Signs of infection such as: chills or fever occurring within 24 hours   after the procedure.  5. Rectal bleeding, which would show as bright red, maroon, or black stools.   (A tablespoon of blood from the rectum is not serious, especially  if   hemorrhoids are present.)  6. Vomiting.  7. Weakness or dizziness.  GO DIRECTLY TO THE NEAREST EMERGENCY ROOM IF YOU HAVE ANY OF THE FOLLOWING:      Difficulty breathing              Chills and/or fever over 101 F   Persistent vomiting and/or vomiting blood   Severe abdominal pain   Severe chest pain   Black, tarry stools   Bleeding- more than one tablespoon   Any other symptom or condition that you feel may need urgent attention  Your doctor recommends these additional instructions:  If any biopsies were taken, your doctors clinic will contact you in 1 to 2   weeks with any results.  Recommendations:  - [facility] to [facility] for [facility] care.   - Resume previous diet.   - Continue present medications.   - Await pathology results.   - Consult oncology and colorectal surgery  - Please call with questions or concerns  - If patient undergoes surgery and chemo, can repeat colonoscopy 3-6 months   from surgery to evaluate for synchronous lesions  Impressions:  Colonsocopy for possible sigmoid colon mass on CT imaging with possible   liver mets  - Preparation of the colon was inadequate for screening/surveillance   purposes.   - Likely malignant partially obstructing tumor in the recto-sigmoid colon   about 10 cm from anal verge.  Biopsied.  Tattooed 2-3 cm distal to lesion.     For questions, problems or results please call your physician - Lincoln Alvarenga MD at Work:  [phone].  [facility] ED at [phone]  IF A COMPLICATION OR EMERGENCY SITUATION ARISES AND YOU ARE UNABLE TO REACH   YOUR PHYSICIAN - GO DIRECTLY TO THE EMERGENCY ROOM.  MD Lincoln Liu MD  4/8/2025 11:19:33 AM  This report has been verified and signed electronically.  Dear patient,  As a result of recent federal legislation (The Federal Cures Act), you may   receive lab or pathology results from your procedure in your [facility]   account before your physician is able to contact you.  Your physician or   their representative will relay the results to you with their   recommendations at their soonest availability.  Thank you,  PROVATION

## 2025-04-08 NOTE — ANESTHESIA PREPROCEDURE EVALUATION
04/08/2025  Lincoln Vizcarra is a 64 y.o., male with Bell's palsy and recently diagnosed metastatic colon cancer admitted April 7th with a chief complaint of low back pain and abdominal pain with significant unintended weight loss.  CT evidence of sigmoid mass with liver and bone Mets without comment of obstruction.  Patient presents today for colonoscopy.  Patient notes normal bowel movements and flatus      Pre-op Assessment    I have reviewed the Patient Summary Reports.    I have reviewed the NPO Status.   I have reviewed the Medications.     Review of Systems  Anesthesia Hx:               Denies Personal Hx of Anesthesia complications.                    Social:  Non-Smoker       Hematology/Oncology:       -- Anemia:             Colon has confirmed metastises     Metastises: bone and liver        Current/Recent Cancer.                Cardiovascular:   Functional Capacity 2 METS                             Physical Exam  General: Well nourished, Cooperative, Alert and Oriented    Airway:  Mallampati: II   Mouth Opening: Normal  TM Distance: Normal  Tongue: Normal  Neck ROM: Normal ROM    Chest/Lungs:  Clear to auscultation, Normal Respiratory Rate    Heart:  Rate: Normal  Rhythm: Regular Rhythm        Anesthesia Plan  Type of Anesthesia, risks & benefits discussed:    Anesthesia Type: Gen Natural Airway  Intra-op Monitoring Plan: Standard ASA Monitors  Induction:  IV  Informed Consent: Informed consent signed with the Patient and all parties understand the risks and agree with anesthesia plan.  All questions answered.   ASA Score: 3  Day of Surgery Review of History & Physical: H&P Update referred to the surgeon/provider.    Ready For Surgery From Anesthesia Perspective.     .

## 2025-04-08 NOTE — PLAN OF CARE
04/08/25 0957   Discharge Assessment   Assessment Type Discharge Planning Assessment   Confirmed/corrected address, phone number and insurance Yes   Confirmed Demographics Correct on Facesheet   Source of Information family   Communicated REBECCA with patient/caregiver Date not available/Unable to determine   Reason For Admission intractable pain   People in Home significant other   Do you expect to return to your current living situation? Yes   Do you have help at home or someone to help you manage your care at home? Yes   Who are your caregiver(s) and their phone number(s)? significant other Heidy Landrum [phone]   Prior to hospitilization cognitive status: Alert/Oriented   Current cognitive status: Alert/Oriented   Walking or Climbing Stairs Difficulty no   Dressing/Bathing Difficulty no   Equipment Currently Used at Home none   Do you currently have service(s) that help you manage your care at home? No   Do you take prescription medications? Yes   Do you have prescription coverage? Yes   Coverage BCBS   Do you have any problems affording any of your prescribed medications? No   Is the patient taking medications as prescribed? yes   Who is going to help you get home at discharge? significant other   How do you get to doctors appointments? car, drives self   Are you on dialysis? No   Do you take coumadin? No   Discharge Plan A Home   Discharge Plan B Home   DME Needed Upon Discharge  none   Discharge Plan discussed with: Spouse/sig other   Name(s) and Number(s) significant other Heidy   Transition of Care Barriers None   OTHER   Name(s) of People in Home significant other [place]

## 2025-04-08 NOTE — PLAN OF CARE
Problem: Adult Inpatient Plan of Care  Goal: Plan of Care Review  Outcome: Progressing  Flowsheets (Taken 4/7/2025 1914)  Plan of Care Reviewed With:   patient   spouse  Goal: Patient-Specific Goal (Individualized)  Outcome: Progressing  Goal: Absence of Hospital-Acquired Illness or Injury  Outcome: Progressing  Intervention: Identify and Manage Fall Risk  Flowsheets (Taken 4/7/2025 1914)  Safety Promotion/Fall Prevention:   assistive device/personal item within reach   side rails raised x 2   medications reviewed   lighting adjusted   instructed to call staff for mobility   nonskid shoes/socks when out of bed  Intervention: Prevent Skin Injury  Flowsheets (Taken 4/7/2025 1914)  Body Position: position changed independently  Skin Protection: incontinence pads utilized  Device Skin Pressure Protection: positioning supports utilized  Intervention: Prevent and Manage VTE (Venous Thromboembolism) Risk  Flowsheets (Taken 4/7/2025 1914)  VTE Prevention/Management:   dorsiflexion/plantar flexion performed   bleeding risk assessed   ambulation promoted  Intervention: Prevent Infection  Flowsheets (Taken 4/7/2025 1914)  Infection Prevention:   rest/sleep promoted   single patient room provided   hand hygiene promoted  Goal: Optimal Comfort and Wellbeing  Outcome: Progressing  Intervention: Monitor Pain and Promote Comfort  Flowsheets (Taken 4/7/2025 1914)  Pain Management Interventions:   position adjusted   pillow support provided   medication offered   care clustered   quiet environment facilitated  Intervention: Provide Person-Centered Care  Flowsheets (Taken 4/7/2025 1914)  Trust Relationship/Rapport:   care explained   choices provided   empathic listening provided   questions answered   questions encouraged   thoughts/feelings acknowledged   reassurance provided  Goal: Readiness for Transition of Care  Outcome: Progressing     Problem: Fall Injury Risk  Goal: Absence of Fall and Fall-Related Injury  Outcome:  Progressing  Intervention: Identify and Manage Contributors  Flowsheets (Taken 4/7/2025 1914)  Self-Care Promotion: independence encouraged  Medication Review/Management: medications reviewed  Intervention: Promote Injury-Free Environment  Flowsheets (Taken 4/7/2025 1914)  Safety Promotion/Fall Prevention:   assistive device/personal item within reach   side rails raised x 2   medications reviewed   lighting adjusted   instructed to call staff for mobility   nonskid shoes/socks when out of bed     Problem: Pain Acute  Goal: Optimal Pain Control and Function  Outcome: Progressing  Intervention: Develop Pain Management Plan  Flowsheets (Taken 4/7/2025 1914)  Pain Management Interventions:   position adjusted   pillow support provided   medication offered   care clustered   quiet environment facilitated  Intervention: Prevent or Manage Pain  Flowsheets (Taken 4/7/2025 1914)  Sleep/Rest Enhancement: awakenings minimized  Medication Review/Management: medications reviewed  Intervention: Optimize Psychosocial Wellbeing  Flowsheets (Taken 4/7/2025 1914)  Supportive Measures:   active listening utilized   decision-making supported   self-care encouraged

## 2025-04-08 NOTE — PLAN OF CARE
Problem: Adult Inpatient Plan of Care  Goal: Plan of Care Review  4/8/2025 0759 by Kasandra Liu RN  Outcome: Progressing  4/7/2025 1832 by Kasandra Liu RN  Outcome: Progressing  Goal: Patient-Specific Goal (Individualized)  4/8/2025 0759 by Kasandra Liu RN  Outcome: Progressing  4/7/2025 1832 by Kasandra Liu RN  Outcome: Progressing  Goal: Absence of Hospital-Acquired Illness or Injury  4/8/2025 0759 by Kasandra Liu RN  Outcome: Progressing  4/7/2025 1832 by Kasandra Liu RN  Outcome: Progressing  Goal: Optimal Comfort and Wellbeing  4/8/2025 0759 by Kasandra Liu RN  Outcome: Progressing  4/7/2025 1832 by Kasandra Liu RN  Outcome: Progressing  Goal: Readiness for Transition of Care  4/8/2025 0759 by Kasandra Liu RN  Outcome: Progressing  4/7/2025 1832 by Kasandra Liu RN  Outcome: Progressing     Problem: Fall Injury Risk  Goal: Absence of Fall and Fall-Related Injury  4/8/2025 0759 by Kasandra Liu RN  Outcome: Progressing  4/7/2025 1832 by Kasandra Liu RN  Outcome: Progressing     Problem: Pain Acute  Goal: Optimal Pain Control and Function  4/8/2025 0759 by Kasandra Liu RN  Outcome: Progressing  4/7/2025 1832 by Kasandra Liu RN  Outcome: Progressing

## 2025-04-09 PROBLEM — C18.9 COLON CANCER: Status: ACTIVE | Noted: 2025-04-09

## 2025-04-09 LAB
ALBUMIN SERPL-MCNC: 2.9 G/DL (ref 3.4–4.8)
ALBUMIN/GLOB SERPL: 0.9 RATIO (ref 1.1–2)
ALP SERPL-CCNC: 744 UNIT/L (ref 40–150)
ALT SERPL-CCNC: 163 UNIT/L (ref 0–55)
ANION GAP SERPL CALC-SCNC: 11 MEQ/L
AST SERPL-CCNC: 140 UNIT/L (ref 11–45)
BILIRUB SERPL-MCNC: 0.8 MG/DL
BUN SERPL-MCNC: 20.1 MG/DL (ref 8.4–25.7)
CALCIUM SERPL-MCNC: 8.6 MG/DL (ref 8.8–10)
CHLORIDE SERPL-SCNC: 103 MMOL/L (ref 98–107)
CO2 SERPL-SCNC: 25 MMOL/L (ref 23–31)
CREAT SERPL-MCNC: 0.63 MG/DL (ref 0.72–1.25)
CREAT/UREA NIT SERPL: 32
GFR SERPLBLD CREATININE-BSD FMLA CKD-EPI: >60 ML/MIN/1.73/M2
GLOBULIN SER-MCNC: 3.3 GM/DL (ref 2.4–3.5)
GLUCOSE SERPL-MCNC: 90 MG/DL (ref 82–115)
POTASSIUM SERPL-SCNC: 4.2 MMOL/L (ref 3.5–5.1)
PROT SERPL-MCNC: 6.2 GM/DL (ref 5.8–7.6)
PSYCHE PATHOLOGY RESULT: NORMAL
SODIUM SERPL-SCNC: 139 MMOL/L (ref 136–145)

## 2025-04-09 PROCEDURE — 80053 COMPREHEN METABOLIC PANEL: CPT | Performed by: INTERNAL MEDICINE

## 2025-04-09 PROCEDURE — 11000001 HC ACUTE MED/SURG PRIVATE ROOM

## 2025-04-09 PROCEDURE — 99222 1ST HOSP IP/OBS MODERATE 55: CPT | Mod: ,,,

## 2025-04-09 PROCEDURE — 25000003 PHARM REV CODE 250: Performed by: PHYSICIAN ASSISTANT

## 2025-04-09 PROCEDURE — 36415 COLL VENOUS BLD VENIPUNCTURE: CPT | Performed by: INTERNAL MEDICINE

## 2025-04-09 PROCEDURE — 25000003 PHARM REV CODE 250: Performed by: INTERNAL MEDICINE

## 2025-04-09 PROCEDURE — 99233 SBSQ HOSP IP/OBS HIGH 50: CPT | Mod: ,,, | Performed by: COLON & RECTAL SURGERY

## 2025-04-09 RX ADMIN — OXYCODONE AND ACETAMINOPHEN 1 TABLET: 10; 325 TABLET ORAL at 04:04

## 2025-04-09 RX ADMIN — OXYCODONE AND ACETAMINOPHEN 1 TABLET: 10; 325 TABLET ORAL at 12:04

## 2025-04-09 RX ADMIN — SENNOSIDES AND DOCUSATE SODIUM 1 TABLET: 50; 8.6 TABLET ORAL at 09:04

## 2025-04-09 RX ADMIN — POLYETHYLENE GLYCOL 3350 17 G: 17 POWDER, FOR SOLUTION ORAL at 09:04

## 2025-04-09 RX ADMIN — OXYCODONE AND ACETAMINOPHEN 1 TABLET: 10; 325 TABLET ORAL at 06:04

## 2025-04-09 RX ADMIN — DOCUSATE SODIUM 100 MG: 100 CAPSULE, LIQUID FILLED ORAL at 09:04

## 2025-04-09 RX ADMIN — SENNOSIDES AND DOCUSATE SODIUM 1 TABLET: 50; 8.6 TABLET ORAL at 08:04

## 2025-04-09 NOTE — PROGRESS NOTES
[facility]  Hospital Medicine Progress Note        Chief Complaint: Inpatient Follow-up     HPI: Lincoln Vizcarra is a 64 y.o. male who  has no past medical history on file.. The patient presented to [facility] on 4/7/2025 with a primary complaint of radiating lower back pain moving down his spine and abdominal pain. Pain started on February and worse yesterday which made him present to the hospital as he never heard back regarding Gi follow up appointments outpatient. Patient  reporting he has been dealing with constipation and 80 lb weight loss within the last year.Denies any incontinence.      Of note, patient stated that he was diagnosed with colon cancer 3 weeks ago that has spread to his liver and has been waiting to follow up with GI.       On arrival, afebrile, hemodynamically stable on room air.  Labs with hemoglobin 12.  Elevated transaminases.     CT scan: Impression:     Circumferential, apple-core type malignant-appearing mass in the sigmoid colon.     Multiple liver metastasis appears slightly worse than the prior examination     Bone metastasis seen in the sacrum on the right side and the left 9th rib at the costovertebral junction.     Admitted to Hospital Medicine for further management.    GI was consulted. S/p colonoscopy.  Biopsy sent.    Interval Hx:    Colorectal surgery and oncology on board     Patient without any symptoms of pain today.  Chart was reviewed, afebrile, hemodynamically stable, most recent lab work was reviewed.    Objective/physical exam:  General: In no acute distress, afebrile  Chest: Clear to auscultation bilaterally  Heart:  +S1, S2, normal rate  Abdomen: Soft, nontender, BS +  MSK: Warm, no lower extremity edema, no clubbing or cyanosis  Neurologic: Alert and oriented x4, Cranial nerve II-XII intact, Strength 5/5 in all 4 extremities    VITAL SIGNS: 24 HRS MIN & MAX LAST   Temp  Min: 97.6 °F (36.4 °C)  Max: 98.9 °F (37.2 °C) 98.9 °F (37.2 °C)    BP  Min: 106/65  Max: 119/73 110/67   Pulse  Min: 75  Max: 91  75   Resp  Min: 14  Max: 20 14   SpO2  Min: 98 %  Max: 100 % 99 %     I have reviewed the following labs:  Recent Labs   Lab 04/07/25  1136 04/08/25  0305   WBC 8.19 9.49   RBC 3.81* 3.37*   HGB 12.1* 11.1*   HCT 36.8* 31.3*   MCV 96.6* 92.9   MCH 31.8* 32.9*   MCHC 32.9* 35.5   RDW 13.0 13.0    398   MPV 8.8 9.2     Recent Labs   Lab 04/07/25  1038 04/08/25  0305 04/09/25  0448   * 136 139   K 5.0 4.4 4.2    103 103   CO2 20* 22* 25   BUN 26.9* 20.1 20.1   CREATININE 0.85 0.68* 0.63*   CALCIUM 10.1* 8.6* 8.6*   ALBUMIN 3.7 2.9* 2.9*   ALKPHOS 862* 755* 744*   * 203* 163*   * 219* 140*   BILITOT 1.0 0.9 0.8     Microbiology Results (last 7 days)       ** No results found for the last 168 hours. **             See below for Radiology    Assessment/Plan:  Abdominal pain  Lower back pain  Metastatic colon cancer-Mets to liver, spine  Constipation  Transaminitis, suspect from mets     Plan   Analgesics p.r.n.  Gastroenterology on board,s/p colonoscopy,sent biopsy. F/u  Colorectal surgery  consulted, f/u recs  Oncology consulted.Chemo can be outpatient  Monitor bowel movements, Stool softeners,laxatives as needed.  Monitor LFTs.  No home meds.    VTE prophylaxis: scds      Anticipated discharge and Disposition:   To be decided      All diagnosis and differential diagnosis have been reviewed; assessment and plan has been documented; I have personally reviewed the labs and test results that are presently available; I have reviewed the patients medication list; I have reviewed the consulting providers response and recommendations. I have reviewed or attempted to review medical records based upon their availability    All of the patient's questions have been  addressed and answered. Patient's is agreeable to the above stated plan. I will continue to monitor closely and make adjustments to medical management as  needed.    Portions of this note dictated using EMR integrated voice recognition software, and may be subject to voice recognition errors not corrected at proofreading. Please contact writer for clarification if needed.   _____________________________________________________________________    Malnutrition Status:  Nutrition consulted. Most recent weight and BMI monitored-     Measurements:  Wt Readings from Last 1 Encounters:   04/07/25 55.1 kg (121 lb 7.6 oz)   Body mass index is 16.47 kg/m².    Patient has been screened and assessed by RD.    Malnutrition Type:  Context: chronic illness  Level: severe    Malnutrition Characteristic Summary:  Weight Loss (Malnutrition): greater than 5% in 1 month  Energy Intake (Malnutrition): less than or equal to 75% for greater than or equal to 1 month  Subcutaneous Fat (Malnutrition): severe depletion  Muscle Mass (Malnutrition): severe depletion    Interventions/Recommendations (treatment strategy):        Scheduled Med:   docusate sodium  100 mg Oral Daily    polyethylene glycol  17 g Oral Daily    senna-docusate  1 tablet Oral BID      Continuous Infusions:     PRN Meds:    Current Facility-Administered Medications:     oxyCODONE-acetaminophen, 1 tablet, Oral, Q6H PRN     Radiology:  I have personally reviewed the following imaging and agree with the radiologist.     CT Chest Abdomen Pelvis With IV Contrast (XPD) NO Oral Contrast  Narrative: EXAMINATION:  CT CHEST ABDOMEN PELVIS WITH IV CONTRAST (XPD)    CLINICAL HISTORY:  metastatic malignancy, increased back/abdominal pain, eval for pathologic spine fx, bowel obstruction;    TECHNIQUE:  Low dose axial images, sagittal and coronal reformations were obtained from the thoracic inlet to the pubic symphysis following the IV and oral contrast administration.  Automatic exposure control is utilized to reduce patient radiation exposure.    COMPARISON:  03/20/2025    FINDINGS:  There are changes seen consistent with emphysema and  COPD in the lungs bilaterally..  No mass is seen.  No nodule is seen.  No pleural thickening is seen.  No pleural effusion is seen.  No infiltrate is seen.    The thoracic aorta is normal in caliber..    No mediastinal adenopathy is seen.    The heart appears normal in size..    There are multiple too numerous to count liver metastasis seen.  Similar changes were seen previously.  Representative measurement is made of a lesion in segment 4 of the liver image 92 series 2.  It measures 4.5 x 4.9 cm on today's examination and previously it measured 4.1 by 4.2 cm.  It appears minimally larger.    Some intrahepatic biliary dilatation is seen in the right lobe of the liver likely due to mechanical obstructive changes from the extensive liver metastasis.  The gallbladder appears unremarkable.    The spleen appears normal.  No splenic mass or lesion is seen.    The pancreas appears grossly unremarkable.  No pancreatic mass or lesion is seen.  No inflammation is seen.    No adrenal abnormality is seen.  No adrenal nodule is seen.    The kidneys are well perfused.  No hydronephrosis is seen.  No hydroureter is seen.  No retroperitoneal abnormality is seen..    There is a mass in the sigmoid colon.  It has a apple-core type appearance and appears slightly larger than the prior examination.    No free air is seen.  No free fluid is seen.    Urinary bladder appears unremarkable.    There is a slightly expansile lytic metastasis involving the sacrum on the right side.  It is causing some cortical destruction.  The lesion measures 2 cm x 2.6 cm as measured on image number 167 series 2.  There is a lytic bone metastasis involving the 9th rib on the left side at the costovertebral junction.  There is best seen on image number 65 series 2.  No other bone metastasis is seen.  Impression: Circumferential, apple-core type malignant-appearing mass in the sigmoid colon.    Multiple liver metastasis appears slightly worse than the prior  examination    Bone metastasis seen in the sacrum on the right side and the left 9th rib at the costovertebral junction    This report was flagged in Epic as abnormal.    Electronically signed by: Giovanni Begum  Date:    04/07/2025  Time:    12:21      Ashley Colorado MD  Department of Hospital Medicine   [facility]   04/09/2025

## 2025-04-09 NOTE — PLAN OF CARE
Problem: Adult Inpatient Plan of Care  Goal: Plan of Care Review  Outcome: Progressing  Flowsheets (Taken 4/8/2025 1931)  Plan of Care Reviewed With: patient  Goal: Patient-Specific Goal (Individualized)  Outcome: Progressing  Goal: Absence of Hospital-Acquired Illness or Injury  Outcome: Progressing  Intervention: Identify and Manage Fall Risk  Flowsheets (Taken 4/8/2025 1931)  Safety Promotion/Fall Prevention:   assistive device/personal item within reach   side rails raised x 2   muscle strengthening facilitated   Fall Risk signage in place   Fall Risk reviewed with patient/family   commode/urinal/bedpan at bedside   nonskid shoes/socks when out of bed   lighting adjusted   medications reviewed   instructed to call staff for mobility  Intervention: Prevent Skin Injury  Flowsheets (Taken 4/8/2025 1931)  Body Position: position changed independently  Skin Protection: incontinence pads utilized  Device Skin Pressure Protection: absorbent pad utilized/changed  Intervention: Prevent and Manage VTE (Venous Thromboembolism) Risk  Flowsheets (Taken 4/8/2025 1931)  VTE Prevention/Management:   dorsiflexion/plantar flexion performed   bleeding risk assessed   fluids promoted  Intervention: Prevent Infection  Flowsheets (Taken 4/8/2025 1931)  Infection Prevention:   rest/sleep promoted   single patient room provided   hand hygiene promoted  Goal: Optimal Comfort and Wellbeing  Outcome: Progressing  Intervention: Monitor Pain and Promote Comfort  Flowsheets (Taken 4/8/2025 1931)  Pain Management Interventions:   position adjusted   pillow support provided   medication offered   care clustered  Intervention: Provide Person-Centered Care  Flowsheets (Taken 4/8/2025 1931)  Trust Relationship/Rapport:   reassurance provided   care explained   empathic listening provided   questions answered   questions encouraged   emotional support provided   choices provided   thoughts/feelings acknowledged  Goal: Readiness for Transition of  Care  Outcome: Progressing     Problem: Fall Injury Risk  Goal: Absence of Fall and Fall-Related Injury  Outcome: Progressing  Intervention: Identify and Manage Contributors  Flowsheets (Taken 4/8/2025 1931)  Self-Care Promotion: independence encouraged  Medication Review/Management: medications reviewed  Intervention: Promote Injury-Free Environment  Flowsheets (Taken 4/8/2025 1931)  Safety Promotion/Fall Prevention:   assistive device/personal item within reach   side rails raised x 2   muscle strengthening facilitated   Fall Risk signage in place   Fall Risk reviewed with patient/family   commode/urinal/bedpan at bedside   nonskid shoes/socks when out of bed   lighting adjusted   medications reviewed   instructed to call staff for mobility     Problem: Pain Acute  Goal: Optimal Pain Control and Function  Outcome: Progressing  Intervention: Develop Pain Management Plan  Flowsheets (Taken 4/8/2025 1931)  Pain Management Interventions:   position adjusted   pillow support provided   medication offered   care clustered  Intervention: Prevent or Manage Pain  Flowsheets (Taken 4/8/2025 1931)  Sleep/Rest Enhancement:   awakenings minimized   regular sleep/rest pattern promoted  Medication Review/Management: medications reviewed  Intervention: Optimize Psychosocial Wellbeing  Flowsheets (Taken 4/8/2025 1931)  Supportive Measures:   active listening utilized   decision-making supported   self-care encouraged
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
## 2025-04-09 NOTE — PLAN OF CARE
Problem: Adult Inpatient Plan of Care  Goal: Plan of Care Review  Outcome: Progressing  Goal: Patient-Specific Goal (Individualized)  Outcome: Progressing  Goal: Absence of Hospital-Acquired Illness or Injury  Outcome: Progressing  Goal: Optimal Comfort and Wellbeing  Outcome: Progressing  Goal: Readiness for Transition of Care  Outcome: Progressing     Problem: Fall Injury Risk  Goal: Absence of Fall and Fall-Related Injury  Outcome: Progressing     Problem: Pain Acute  Goal: Optimal Pain Control and Function  Outcome: Progressing  Intervention: Develop Pain Management Plan  Flowsheets (Taken 4/9/2025 5216)  Pain Management Interventions: medication offered  Intervention: Prevent or Manage Pain  Flowsheets (Taken 4/9/2025 1411)  Sensory Stimulation Regulation: quiet environment promoted  Bowel Elimination Promotion:   adequate fluid intake promoted   ambulation promoted  Medication Review/Management: medications reviewed

## 2025-04-10 DIAGNOSIS — C18.9 MALIGNANT NEOPLASM OF COLON, UNSPECIFIED PART OF COLON: Primary | ICD-10-CM

## 2025-04-10 PROCEDURE — 99231 SBSQ HOSP IP/OBS SF/LOW 25: CPT | Mod: 57,,,

## 2025-04-10 PROCEDURE — 25000003 PHARM REV CODE 250: Performed by: INTERNAL MEDICINE

## 2025-04-10 PROCEDURE — 25000003 PHARM REV CODE 250: Performed by: PHYSICIAN ASSISTANT

## 2025-04-10 PROCEDURE — 11000001 HC ACUTE MED/SURG PRIVATE ROOM

## 2025-04-10 PROCEDURE — 99233 SBSQ HOSP IP/OBS HIGH 50: CPT | Mod: ,,, | Performed by: INTERNAL MEDICINE

## 2025-04-10 RX ORDER — SYRING-NEEDL,DISP,INSUL,0.3 ML 29 G X1/2"
296 SYRINGE, EMPTY DISPOSABLE MISCELLANEOUS ONCE
Status: DISCONTINUED | OUTPATIENT
Start: 2025-04-10 | End: 2025-04-11 | Stop reason: HOSPADM

## 2025-04-10 RX ADMIN — SENNOSIDES AND DOCUSATE SODIUM 1 TABLET: 50; 8.6 TABLET ORAL at 08:04

## 2025-04-10 RX ADMIN — OXYCODONE AND ACETAMINOPHEN 1 TABLET: 10; 325 TABLET ORAL at 01:04

## 2025-04-10 RX ADMIN — DOCUSATE SODIUM 100 MG: 100 CAPSULE, LIQUID FILLED ORAL at 08:04

## 2025-04-10 RX ADMIN — OXYCODONE AND ACETAMINOPHEN 1 TABLET: 10; 325 TABLET ORAL at 12:04

## 2025-04-10 RX ADMIN — OXYCODONE AND ACETAMINOPHEN 1 TABLET: 10; 325 TABLET ORAL at 08:04

## 2025-04-10 RX ADMIN — POLYETHYLENE GLYCOL 3350 17 G: 17 POWDER, FOR SOLUTION ORAL at 08:04

## 2025-04-10 RX ADMIN — OXYCODONE AND ACETAMINOPHEN 1 TABLET: 10; 325 TABLET ORAL at 06:04

## 2025-04-10 NOTE — PROGRESS NOTES
Colon & Rectal Surgery Progress Note      Subjective:    Doing well, reports tolerating diet and still having bowel movements.  Pathology sigmoid mass biopsy: invasive moderately differentiated adenocarcinoma    Objective:  Temp:  [97.6 °F (36.4 °C)-98.9 °F (37.2 °C)]   Pulse:  [61-76]   Resp:  [14-20]   BP: ()/(54-69)   SpO2:  [98 %-99 %]     Physical Exam:  NAD  Regular rate and rhythm  Non-labored respirations  Abdomen soft, appropriate, non distended  SCDs in place      Intake/Output Summary (Last 24 hours) at 4/10/2025 0844  Last data filed at 4/9/2025 2300  Gross per 24 hour   Intake 240 ml   Output --   Net 240 ml       Recent Labs     04/08/25  0305 04/09/25  0448   WBC 9.49  --    HGB 11.1*  --    HCT 31.3*  --      --     139   K 4.4 4.2    103   CO2 22* 25   BUN 20.1 20.1   CREATININE 0.68* 0.63*   BILITOT 0.9 0.8   * 140*   * 163*   ALKPHOS 755* 744*   CALCIUM 8.6* 8.6*   ALBUMIN 2.9* 2.9*       Assessment/Plan    - Spoke with Dr. Hill, plan for Mediport placement tomorrow.  - Okay to discharge after procedure tomorrow  - NPO after midnight tonight.      Leticia Liu, FNP  Colon & Rectal Surgery  [facility] - Oncology Acute
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
## 2025-04-10 NOTE — PROGRESS NOTES
[facility]  Oncology [facility] Medicine - Progress Note      Patient Name: Lincoln Vizcarra  MRN: [id number]  Admission Date: 4/7/2025 - IP- Inpatient   Length of Stay: 3  Code Status: Full    Subjective   Chief Complaint:   Inpatient Follow-up for metastatic colon cancer    HPI and Hospital course:  This is a 64-year-old with medical history notable for recently diagnosed stage IV moderately differentiated adenocarcinoma of the colon with metastatic disease to the liver and bones initially revealed after presenting to the emergency department on 03/20/2025 with abdominal and back pain and unintentional weight loss and CT abdomen pelvis revealed sigmoid mass with extensive hepatic metastatic disease and discharged with outpatient follow up/referral but ultimately presented finally to the ED on 04/07/2025 and a CT chest abdomen and pelvis revealed apple core type malignant appearing mass in the sigmoid colon and slightly worsened multiple liver metastatic lesions as well as bone metastatic lesions in the sacrum and right side of the left 9th rib costovertebral junction, underwent flexible sigmoidoscopy on 04/08/2025 revealing likely malignant partially obstructing tumor in the rectosigmoid, biopsied and pathology returned moderately differentiated adenocarcinoma.  Oncology consulted and plan to start FOLFOX on 04/15/2025 given the likelihood of colonic obstruction if treatment delayed to obtain liver biopsy.     ROS: Except as documented, all systems reviewed and negative.    Interval History:  Doing well today, no overnight events, having bowel movement and passing flatus, no labs done today.  Awaiting MediPort placement tomorrow and likely discharge afterwards.    Objective   Physical exam:  Vital Signs  Temp:  [98.5 °F (36.9 °C)-98.8 °F (37.1 °C)]   Pulse:  [68-74]   Resp:  [14-20]   BP: ()/(60-71)   SpO2:  [99 %]    General: Appears comfortable  HEENT: NC/AT  Neck:  No JVD  Chest:  CTABL  CVS: Regular rhythm. Normal S1/S2.  Abdomen: nondistended, normoactive BS, soft and non-tender.  MSK: No obvious deformity or joint swelling  Skin: Warm and dry  Neuro: AAOx3  Psych: Cooperative    Labs & diagnostics  Recent Labs     04/08/25  0305   WBC 9.49   HGB 11.1*   HCT 31.3*         Recent Labs     04/08/25  0305 04/09/25  0448    139   K 4.4 4.2    103   CO2 22* 25   BUN 20.1 20.1   CREATININE 0.68* 0.63*   EGFRNORACEVR >60 >60   GLUCOSE 87 90   CALCIUM 8.6* 8.6*   ALBUMIN 2.9* 2.9*   GLOBULIN 3.4 3.3   ALKPHOS 755* 744*   * 163*   * 140*   BILITOT 0.9 0.8        Microbiology Results (last 7 days)       ** No results found for the last 168 hours. **           CT Chest Abdomen Pelvis With IV Contrast (XPD) NO Oral Contrast  Narrative: EXAMINATION:  CT CHEST ABDOMEN PELVIS WITH IV CONTRAST (XPD)    CLINICAL HISTORY:  metastatic malignancy, increased back/abdominal pain, eval for pathologic spine fx, bowel obstruction;    TECHNIQUE:  Low dose axial images, sagittal and coronal reformations were obtained from the thoracic inlet to the pubic symphysis following the IV and oral contrast administration.  Automatic exposure control is utilized to reduce patient radiation exposure.    COMPARISON:  03/20/2025    FINDINGS:  There are changes seen consistent with emphysema and COPD in the lungs bilaterally..  No mass is seen.  No nodule is seen.  No pleural thickening is seen.  No pleural effusion is seen.  No infiltrate is seen.    The thoracic aorta is normal in caliber..    No mediastinal adenopathy is seen.    The heart appears normal in size..    There are multiple too numerous to count liver metastasis seen.  Similar changes were seen previously.  Representative measurement is made of a lesion in segment 4 of the liver image 92 series 2.  It measures 4.5 x 4.9 cm on today's examination and previously it measured 4.1 by 4.2 cm.  It appears minimally larger.    Some  intrahepatic biliary dilatation is seen in the right lobe of the liver likely due to mechanical obstructive changes from the extensive liver metastasis.  The gallbladder appears unremarkable.    The spleen appears normal.  No splenic mass or lesion is seen.    The pancreas appears grossly unremarkable.  No pancreatic mass or lesion is seen.  No inflammation is seen.    No adrenal abnormality is seen.  No adrenal nodule is seen.    The kidneys are well perfused.  No hydronephrosis is seen.  No hydroureter is seen.  No retroperitoneal abnormality is seen..    There is a mass in the sigmoid colon.  It has a apple-core type appearance and appears slightly larger than the prior examination.    No free air is seen.  No free fluid is seen.    Urinary bladder appears unremarkable.    There is a slightly expansile lytic metastasis involving the sacrum on the right side.  It is causing some cortical destruction.  The lesion measures 2 cm x 2.6 cm as measured on image number 167 series 2.  There is a lytic bone metastasis involving the 9th rib on the left side at the costovertebral junction.  There is best seen on image number 65 series 2.  No other bone metastasis is seen.  Impression: Circumferential, apple-core type malignant-appearing mass in the sigmoid colon.    Multiple liver metastasis appears slightly worse than the prior examination    Bone metastasis seen in the sacrum on the right side and the left 9th rib at the costovertebral junction    This report was flagged in Epic as abnormal.    Electronically signed by: Giovanni Begum  Date:    04/07/2025  Time:    12:21      Inpatient Medications  Scheduled Med:   docusate sodium  100 mg Oral Daily    polyethylene glycol  17 g Oral Daily    senna-docusate  1 tablet Oral BID      Continuous Infusions:     PRN Meds:    Current Facility-Administered Medications:     oxyCODONE-acetaminophen, 1 tablet, Oral, Q6H PRN     Assessment:   Stage IV moderately differentiated colon  adenocarcinoma with liver and bone metastasis  [facility] NGS sent and pending  MediPort placement planned 04/11/2025  FOLFOX planned to start 04/15/2025    Normocytic anemia    Plan:   NPO after midnight for MediPort placement tomorrow  Check ferritin, iron profile, B12 and folate      VTE Prophylaxis:-SCDs  Patient condition:-stable  Disposition:  Discharge home tomorrow after MediPort placement      Chyna Collazo MD  Internal Medicine

## 2025-04-11 ENCOUNTER — ANESTHESIA EVENT (OUTPATIENT)
Dept: SURGERY | Facility: HOSPITAL | Age: 65
End: 2025-04-11
Payer: COMMERCIAL

## 2025-04-11 ENCOUNTER — ANESTHESIA (OUTPATIENT)
Dept: SURGERY | Facility: HOSPITAL | Age: 65
End: 2025-04-11
Payer: COMMERCIAL

## 2025-04-11 VITALS
TEMPERATURE: 98 F | RESPIRATION RATE: 16 BRPM | DIASTOLIC BLOOD PRESSURE: 70 MMHG | SYSTOLIC BLOOD PRESSURE: 118 MMHG | OXYGEN SATURATION: 97 % | BODY MASS INDEX: 16.46 KG/M2 | HEART RATE: 78 BPM | HEIGHT: 72 IN | WEIGHT: 121.5 LBS

## 2025-04-11 LAB
ALBUMIN SERPL-MCNC: 2.8 G/DL (ref 3.4–4.8)
ALBUMIN/GLOB SERPL: 0.8 RATIO (ref 1.1–2)
ALP SERPL-CCNC: 852 UNIT/L (ref 40–150)
ALT SERPL-CCNC: 160 UNIT/L (ref 0–55)
ANION GAP SERPL CALC-SCNC: 10 MEQ/L
AST SERPL-CCNC: 181 UNIT/L (ref 11–45)
BILIRUB SERPL-MCNC: 0.7 MG/DL
BUN SERPL-MCNC: 17.2 MG/DL (ref 8.4–25.7)
CALCIUM SERPL-MCNC: 8.5 MG/DL (ref 8.8–10)
CHLORIDE SERPL-SCNC: 104 MMOL/L (ref 98–107)
CO2 SERPL-SCNC: 23 MMOL/L (ref 23–31)
CREAT SERPL-MCNC: 0.64 MG/DL (ref 0.72–1.25)
CREAT/UREA NIT SERPL: 27
ERYTHROCYTE [DISTWIDTH] IN BLOOD BY AUTOMATED COUNT: 12.9 % (ref 11.5–17)
FERRITIN SERPL-MCNC: 742.16 NG/ML (ref 21.81–274.66)
FOLATE SERPL-MCNC: 7.7 NG/ML (ref 7–31.4)
GFR SERPLBLD CREATININE-BSD FMLA CKD-EPI: >60 ML/MIN/1.73/M2
GLOBULIN SER-MCNC: 3.5 GM/DL (ref 2.4–3.5)
GLUCOSE SERPL-MCNC: 101 MG/DL (ref 82–115)
HCT VFR BLD AUTO: 32.2 % (ref 42–52)
HGB BLD-MCNC: 10.8 G/DL (ref 14–18)
IRON SATN MFR SERPL: 14 % (ref 20–50)
IRON SERPL-MCNC: 20 UG/DL (ref 65–175)
MAGNESIUM SERPL-MCNC: 1.9 MG/DL (ref 1.6–2.6)
MCH RBC QN AUTO: 31.6 PG (ref 27–31)
MCHC RBC AUTO-ENTMCNC: 33.5 G/DL (ref 33–36)
MCV RBC AUTO: 94.2 FL (ref 80–94)
NRBC BLD AUTO-RTO: 0 %
PHOSPHATE SERPL-MCNC: 2.5 MG/DL (ref 2.3–4.7)
PLATELET # BLD AUTO: 426 X10(3)/MCL (ref 130–400)
PMV BLD AUTO: 8.9 FL (ref 7.4–10.4)
POTASSIUM SERPL-SCNC: 4.4 MMOL/L (ref 3.5–5.1)
PROT SERPL-MCNC: 6.3 GM/DL (ref 5.8–7.6)
RBC # BLD AUTO: 3.42 X10(6)/MCL (ref 4.7–6.1)
SODIUM SERPL-SCNC: 137 MMOL/L (ref 136–145)
TIBC SERPL-MCNC: 126 UG/DL (ref 60–240)
TIBC SERPL-MCNC: 146 UG/DL (ref 250–450)
TRANSFERRIN SERPL-MCNC: 122 MG/DL (ref 163–344)
VIT B12 SERPL-MCNC: 854 PG/ML (ref 213–816)
WBC # BLD AUTO: 9.11 X10(3)/MCL (ref 4.5–11.5)

## 2025-04-11 PROCEDURE — 82728 ASSAY OF FERRITIN: CPT | Performed by: INTERNAL MEDICINE

## 2025-04-11 PROCEDURE — 63600175 PHARM REV CODE 636 W HCPCS

## 2025-04-11 PROCEDURE — 25000003 PHARM REV CODE 250

## 2025-04-11 PROCEDURE — B518ZZA FLUOROSCOPY OF SUPERIOR VENA CAVA, GUIDANCE: ICD-10-PCS | Performed by: COLON & RECTAL SURGERY

## 2025-04-11 PROCEDURE — 25000003 PHARM REV CODE 250: Performed by: PHYSICIAN ASSISTANT

## 2025-04-11 PROCEDURE — 36000706: Performed by: COLON & RECTAL SURGERY

## 2025-04-11 PROCEDURE — 37000009 HC ANESTHESIA EA ADD 15 MINS: Performed by: COLON & RECTAL SURGERY

## 2025-04-11 PROCEDURE — 82607 VITAMIN B-12: CPT | Performed by: INTERNAL MEDICINE

## 2025-04-11 PROCEDURE — 02HV33Z INSERTION OF INFUSION DEVICE INTO SUPERIOR VENA CAVA, PERCUTANEOUS APPROACH: ICD-10-PCS | Performed by: COLON & RECTAL SURGERY

## 2025-04-11 PROCEDURE — 84100 ASSAY OF PHOSPHORUS: CPT | Performed by: INTERNAL MEDICINE

## 2025-04-11 PROCEDURE — 36561 INSERT TUNNELED CV CATH: CPT | Mod: RT,,, | Performed by: COLON & RECTAL SURGERY

## 2025-04-11 PROCEDURE — 83540 ASSAY OF IRON: CPT | Performed by: INTERNAL MEDICINE

## 2025-04-11 PROCEDURE — 36000707: Performed by: COLON & RECTAL SURGERY

## 2025-04-11 PROCEDURE — 25000003 PHARM REV CODE 250: Performed by: INTERNAL MEDICINE

## 2025-04-11 PROCEDURE — C1788 PORT, INDWELLING, IMP: HCPCS | Performed by: COLON & RECTAL SURGERY

## 2025-04-11 PROCEDURE — 77001 FLUOROGUIDE FOR VEIN DEVICE: CPT | Mod: 26,,, | Performed by: COLON & RECTAL SURGERY

## 2025-04-11 PROCEDURE — 36415 COLL VENOUS BLD VENIPUNCTURE: CPT | Performed by: INTERNAL MEDICINE

## 2025-04-11 PROCEDURE — 37000008 HC ANESTHESIA 1ST 15 MINUTES: Performed by: COLON & RECTAL SURGERY

## 2025-04-11 PROCEDURE — 63600175 PHARM REV CODE 636 W HCPCS: Performed by: COLON & RECTAL SURGERY

## 2025-04-11 PROCEDURE — 82746 ASSAY OF FOLIC ACID SERUM: CPT | Performed by: INTERNAL MEDICINE

## 2025-04-11 PROCEDURE — 85027 COMPLETE CBC AUTOMATED: CPT | Performed by: INTERNAL MEDICINE

## 2025-04-11 PROCEDURE — 80053 COMPREHEN METABOLIC PANEL: CPT | Performed by: INTERNAL MEDICINE

## 2025-04-11 PROCEDURE — 0JH60WZ INSERTION OF TOTALLY IMPLANTABLE VASCULAR ACCESS DEVICE INTO CHEST SUBCUTANEOUS TISSUE AND FASCIA, OPEN APPROACH: ICD-10-PCS | Performed by: COLON & RECTAL SURGERY

## 2025-04-11 PROCEDURE — 83735 ASSAY OF MAGNESIUM: CPT | Performed by: INTERNAL MEDICINE

## 2025-04-11 PROCEDURE — 25000003 PHARM REV CODE 250: Performed by: COLON & RECTAL SURGERY

## 2025-04-11 DEVICE — PORT POWER SLIM: Type: IMPLANTABLE DEVICE | Site: CHEST  WALL | Status: FUNCTIONAL

## 2025-04-11 RX ORDER — POLYETHYLENE GLYCOL 3350 17 G/17G
17 POWDER, FOR SOLUTION ORAL DAILY
Qty: 90 EACH | Refills: 0 | Status: SHIPPED | OUTPATIENT
Start: 2025-04-11 | End: 2025-07-10

## 2025-04-11 RX ORDER — AMOXICILLIN 250 MG
1 CAPSULE ORAL 2 TIMES DAILY
Qty: 180 TABLET | Refills: 0 | Status: SHIPPED | OUTPATIENT
Start: 2025-04-11

## 2025-04-11 RX ORDER — LIDOCAINE HYDROCHLORIDE 20 MG/ML
INJECTION, SOLUTION EPIDURAL; INFILTRATION; INTRACAUDAL; PERINEURAL
Status: DISCONTINUED | OUTPATIENT
Start: 2025-04-11 | End: 2025-04-11

## 2025-04-11 RX ORDER — CEFAZOLIN SODIUM 1 G/3ML
INJECTION, POWDER, FOR SOLUTION INTRAMUSCULAR; INTRAVENOUS
Status: DISCONTINUED | OUTPATIENT
Start: 2025-04-11 | End: 2025-04-11

## 2025-04-11 RX ORDER — BUPIVACAINE HYDROCHLORIDE AND EPINEPHRINE 2.5; 5 MG/ML; UG/ML
INJECTION, SOLUTION EPIDURAL; INFILTRATION; INTRACAUDAL; PERINEURAL
Status: DISCONTINUED | OUTPATIENT
Start: 2025-04-11 | End: 2025-04-11 | Stop reason: HOSPADM

## 2025-04-11 RX ORDER — OXYCODONE AND ACETAMINOPHEN 10; 325 MG/1; MG/1
1 TABLET ORAL EVERY 6 HOURS PRN
Qty: 28 TABLET | Refills: 0 | Status: SHIPPED | OUTPATIENT
Start: 2025-04-11 | End: 2025-04-18

## 2025-04-11 RX ORDER — ONDANSETRON HYDROCHLORIDE 2 MG/ML
INJECTION, SOLUTION INTRAVENOUS
Status: DISCONTINUED | OUTPATIENT
Start: 2025-04-11 | End: 2025-04-11

## 2025-04-11 RX ORDER — HEPARIN SODIUM 5000 [USP'U]/ML
INJECTION, SOLUTION INTRAVENOUS; SUBCUTANEOUS
Status: DISCONTINUED | OUTPATIENT
Start: 2025-04-11 | End: 2025-04-11 | Stop reason: HOSPADM

## 2025-04-11 RX ORDER — MORPHINE SULFATE 4 MG/ML
2 INJECTION, SOLUTION INTRAMUSCULAR; INTRAVENOUS EVERY 5 MIN PRN
Refills: 0 | OUTPATIENT
Start: 2025-04-11

## 2025-04-11 RX ORDER — ONDANSETRON 4 MG/1
4 TABLET, ORALLY DISINTEGRATING ORAL EVERY 6 HOURS PRN
Qty: 30 TABLET | Refills: 0 | Status: SHIPPED | OUTPATIENT
Start: 2025-04-11 | End: 2025-05-11

## 2025-04-11 RX ORDER — PROPOFOL 10 MG/ML
VIAL (ML) INTRAVENOUS
Status: DISCONTINUED | OUTPATIENT
Start: 2025-04-11 | End: 2025-04-11

## 2025-04-11 RX ORDER — GLUCAGON 1 MG
1 KIT INJECTION
OUTPATIENT
Start: 2025-04-11

## 2025-04-11 RX ORDER — ONDANSETRON HYDROCHLORIDE 2 MG/ML
4 INJECTION, SOLUTION INTRAVENOUS ONCE AS NEEDED
OUTPATIENT
Start: 2025-04-11 | End: 2036-09-06

## 2025-04-11 RX ORDER — PHENYLEPHRINE HCL IN 0.9% NACL 1 MG/10 ML
SYRINGE (ML) INTRAVENOUS
Status: DISCONTINUED | OUTPATIENT
Start: 2025-04-11 | End: 2025-04-11

## 2025-04-11 RX ADMIN — OXYCODONE AND ACETAMINOPHEN 1 TABLET: 10; 325 TABLET ORAL at 11:04

## 2025-04-11 RX ADMIN — PROPOFOL 100 MCG/KG/MIN: 10 INJECTION, EMULSION INTRAVENOUS at 10:04

## 2025-04-11 RX ADMIN — DOCUSATE SODIUM 100 MG: 100 CAPSULE, LIQUID FILLED ORAL at 11:04

## 2025-04-11 RX ADMIN — POLYETHYLENE GLYCOL 3350 17 G: 17 POWDER, FOR SOLUTION ORAL at 11:04

## 2025-04-11 RX ADMIN — PROPOFOL 40 MG: 10 INJECTION, EMULSION INTRAVENOUS at 10:04

## 2025-04-11 RX ADMIN — PROPOFOL 20 MG: 10 INJECTION, EMULSION INTRAVENOUS at 10:04

## 2025-04-11 RX ADMIN — LIDOCAINE HYDROCHLORIDE 40 MG: 20 INJECTION, SOLUTION EPIDURAL; INFILTRATION; INTRACAUDAL; PERINEURAL at 10:04

## 2025-04-11 RX ADMIN — Medication 50 MCG: at 10:04

## 2025-04-11 RX ADMIN — ONDANSETRON 4 MG: 2 INJECTION INTRAMUSCULAR; INTRAVENOUS at 10:04

## 2025-04-11 RX ADMIN — OXYCODONE AND ACETAMINOPHEN 1 TABLET: 10; 325 TABLET ORAL at 03:04

## 2025-04-11 RX ADMIN — SODIUM CHLORIDE, SODIUM GLUCONATE, SODIUM ACETATE, POTASSIUM CHLORIDE AND MAGNESIUM CHLORIDE: 526; 502; 368; 37; 30 INJECTION, SOLUTION INTRAVENOUS at 10:04

## 2025-04-11 RX ADMIN — CEFAZOLIN 2 G: 330 INJECTION, POWDER, FOR SOLUTION INTRAMUSCULAR; INTRAVENOUS at 10:04

## 2025-04-11 RX ADMIN — SENNOSIDES AND DOCUSATE SODIUM 1 TABLET: 50; 8.6 TABLET ORAL at 11:04

## 2025-04-11 NOTE — TRANSFER OF CARE
Anesthesia Transfer of Care Note    Patient: Lincoln Vizcarra    Procedure(s) Performed: Procedure(s) (LRB):  [id number] (N/A)    Patient location: [facility] Surgical Floor    Anesthesia Type: MAC    Transport from OR: Transported from OR on room air with adequate spontaneous ventilation    Post pain: adequate analgesia    Post assessment: no apparent anesthetic complications    Post vital signs: stable    Level of consciousness: awake    Nausea/Vomiting: no nausea/vomiting    Complications: none    Transfer of care protocol was followed    Last vitals: Visit Vitals  /68   Pulse 76   Temp 37.6 °C (99.7 °F) (Oral)   Resp 15   Ht 6' (1.829 m)   Wt 55.1 kg (121 lb 7.6 oz)   SpO2 100%   BMI 16.47 kg/m²

## 2025-04-11 NOTE — OP NOTE
[facility]  Operative Note      Date of Procedure: 4/11/2025     Procedure:    Right subclavian Port-A-Cath placement  Intraoperative fluoroscopy    Surgeons and Role:     * Kehinde King MD - Primary    Assisting Surgeon: None    Pre-Operative Diagnosis:  Poor venous access    Post-Operative Diagnosis: Same    Anesthesia: General    Estimated Blood Loss (EBL):  Less than 10 mL    Description of Technical Procedures:  After informed consent was obtained, patient was brought to the operating room and placed in the supine position.  Next monitored anesthesia care was provided by a member of the anesthesia team.  The anterior chest wall was prepped and draped in sterile surgical fashion.  Marcaine 0.25% with epinephrine was used for local anesthesia.  Patient was placed in slight Trendelenburg position.  Percutaneous access to the right subclavian vein was achieved with the provided 18 gauge Seldinger needle.  Nonpulsatile blood was encountered.  The guidewire was then passed through the needle into the vena cava below the level of the diaphragm confirmed with fluoroscopy.  A linear skin incision was then made over the right chest wall at the level of the guidewire with a 15 blade.  A subcutaneous reservoir was then created using electrocautery and blunt dissection.  The dilator and introducing sheath were then passed over the guidewire into the superior vena cava under fluoroscopy.  The dilator and wire were removed.  A 6 American catheter was then passed through the sheath into the superior vena cava and situated above the level of the right atrium using fluoroscopy.  The sheath was peeled away and discarded.  Catheter was cut to the appropriate length and attached to the port.  The port was placed within the subcutaneous reservoir and secured to the chest wall with 2-0 Vicryl suture.  The port was accessed with a Jo needle.  It aspirated and flushed easily.  The wound was  irrigated and hemostasis was ascertained.  The incision was closed in a layered fashion using interrupted 2-0 Vicryl suture to reapproximate the subcutaneous tissue and running 4-0 Monocryl suture in a subcuticular fashion to reapproximate the skin edges.  The incision was cleaned and sterile dressing was applied.  Patient tolerated the procedure well there were no complications.  Subsequently he was transferred back to his room where a postoperative chest x-ray was obtained.

## 2025-04-11 NOTE — DISCHARGE SUMMARY
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  Tempus NGS sent and pending  MediPort placed   04/11/2025  FOLFOX planned to start 04/15/2025     Normocytic anemia    Cancer-related pain    Severe protein calorie malnutrition (BMI 16.47, severe subcutaneous fat depletion and muscle loss)    HPI & Hospital Course:  This is a 64-year-old with medical history notable for recently diagnosed stage IV moderately differentiated adenocarcinoma of the colon with metastatic disease to the liver and bones initially revealed after presenting to the emergency department on 03/20/2025 with abdominal and back pain and unintentional weight loss and CT abdomen pelvis revealed sigmoid mass with extensive hepatic metastatic disease and discharged with outpatient follow up/referral but ultimately presented finally to the ED on 04/07/2025 and a CT chest abdomen and pelvis revealed apple core type malignant appearing mass in the sigmoid colon and slightly worsened multiple liver metastatic lesions as well as bone metastatic lesions in the sacrum and right side of the left 9th rib costovertebral junction, underwent flexible sigmoidoscopy on 04/08/2025 revealing likely malignant partially obstructing tumor in the rectosigmoid, biopsied and pathology returned moderately differentiated adenocarcinoma.  Oncology consulted and plan to start FOLFOX on 04/15/2025 given the likelihood of colonic obstruction if treatment delayed to obtain liver biopsy.  MediPort placed on 04/11/2025 and subsequently discharged home in a stable condition.    Discussed  with patient importance of monitoring his bowel habits and if he developed worsening abdominal pain, tension, constipation/obstipation to seek medical care/present to the ED urgently.    Physical Exam:  Vital Signs  Temp:  [97.6 °F (36.4 °C)-99.7 °F (37.6 °C)]   Pulse:  [74-78]   Resp:  [13-16]   BP: (104-121)/(56-73)   SpO2:  [97 %-100 %]    General: Appears comfortable  HEENT: NC/AT  Neck:  No JVD  Chest: CTABL  CVS: Regular rhythm. Normal S1/S2.  Abdomen: nondistended, normoactive BS, soft and non-tender.  MSK: No obvious deformity or joint swelling  Skin: Warm and dry  Neuro: AAOx3, no focal neurological deficit  Psych: Cooperative    Procedures:   No admission procedures for hospital encounter.     Diagnostics:  Recent Labs     04/11/25  0446   WBC 9.11   RBC 3.42*   HGB 10.8*   HCT 32.2*   MCV 94.2*   MCH 31.6*   MCHC 33.5   RDW 12.9   *       Recent Labs     04/11/25  0446   FERRITIN 742.16*   IRON 20*   TIBC 146*   LABIRON 14*   TIMEBNXV17 854*   FOLATE 7.7      Recent Labs     04/09/25  0448 04/11/25  0446    137   K 4.2 4.4    104   CO2 25 23   BUN 20.1 17.2   CREATININE 0.63* 0.64*   EGFRNORACEVR >60 >60   GLUCOSE 90 101   CALCIUM 8.6* 8.5*   MG  --  1.90   PHOS  --  2.5   ALBUMIN 2.9* 2.8*   GLOBULIN 3.3 3.5   ALKPHOS 744* 852*   * 160*   * 181*   BILITOT 0.8 0.7            X-Ray Chest 1 View  Narrative: EXAMINATION:  XR CHEST 1 VIEW    CLINICAL HISTORY:  Port a cath placement;, .    FINDINGS:  No alveolar consolidation, effusion, or pneumothorax is seen.   The thoracic aorta is normal  cardiac silhouette, central pulmonary vessels and mediastinum are normal in size and are grossly unremarkable.   visualized osseous structures are grossly unremarkable.    MediPort is seen from a right approach tip projecting over the region of the superior vena cava  Impression: No acute chest disease is identified.    MediPort insertion    Electronically signed by: Isaias  Damion  Date:    04/11/2025  Time:    12:21  SURG FL Surgery Fluoro Usage  See OP Notes for results.     IMPRESSION: See OP Notes for results.     This procedure was auto-finalized by: Virtual Radiologist      Discharge Medications:     Medication List        START taking these medications      ondansetron 4 MG Tbdl  Commonly known as: ZOFRAN-ODT  Take 1 tablet (4 mg total) by mouth every 6 (six) hours as needed (Nausea and vomiting).     oxyCODONE-acetaminophen  mg per tablet  Commonly known as: PERCOCET  Take 1 tablet by mouth every 6 (six) hours as needed for Pain.     senna-docusate 8.6-50 mg per tablet  Commonly known as: PERICOLACE  Take 1 tablet by mouth 2 (two) times daily.            CONTINUE taking these medications      polyethylene glycol 17 gram Pwpk  Commonly known as: MIRALAX  Take 17 g by mouth once daily.               Where to Get Your Medications        These medications were sent to [facility] - [address]  [facility] [address], [facility] [address]      Phone: [phone]   ondansetron 4 MG Tbdl  oxyCODONE-acetaminophen  mg per tablet  polyethylene glycol 17 gram Pwpk  senna-docusate 8.6-50 mg per tablet          Post discharge follow ups & referrals:   Follow-up Information       Kamla Parry FNP. Schedule an appointment as soon as possible for a visit in 1 month(s).    Specialty: Family Medicine  Contact information:  [address] [place] [id number]  [phone]               Arthur Hill II, MD. Schedule an appointment as soon as possible for a visit in 1 week(s).    Specialties: Oncology, Hematology and Oncology  Contact information:  [address] [facility]  [address]  [facility] [id number] [phone]                             Discharge instructions:  I explained in details to this patient about this discharge plan, medications, and follow-up visits.  Patient understands and agrees to this management plan.  Discharge disposition: Home or Self Care   Discharged condition: stable  Diet:   Dietary Orders (From admission, onward)       Start     Ordered    04/11/25 1141  Diet Adult Regular Standard Tray  Diet effective now        Question:  Tray type:  Answer:  Standard Tray    04/11/25 1140    04/08/25 1549  Dietary nutrition supplements BID; Boost Very High Calorie Nutritional Drink - Vanilla  Continuous        Question Answer Comment   Frequency: BID    Select PO Supplement: Boost Very High Calorie Nutritional Drink - Vanilla        04/08/25 1548                   Activity: as tolerated  Recommendation: for worsening symptoms, chest pain, shortness of breath, increased abdominal pain, high grade fever, stroke or stroke like symptoms, immediately go to the nearest emergency room or call 911 as soon as possible.  For further questions contact hospitalist office at [facility].      Discharge time:  33 minutes    Chyna Collazo MD  4/11/2025. at 5:12 PM.

## 2025-04-11 NOTE — ANESTHESIA POSTPROCEDURE EVALUATION
Anesthesia Post Evaluation    Patient: Lincoln Vizcarra    Procedure(s) Performed: Procedure(s) (LRB):  [id number] (N/A)    Final Anesthesia Type: general      Patient location during evaluation: floor  Patient participation: Yes- Able to Participate  Level of consciousness: oriented and awake  Post-procedure vital signs: reviewed and stable  Pain management: adequate  Airway patency: patent    PONV status at discharge: No PONV  Anesthetic complications: no      Cardiovascular status: stable and hemodynamically stable  Respiratory status: spontaneous ventilation and unassisted  Hydration status: euvolemic  Follow-up not needed.  Comments: [facility]              Vitals Value Taken Time   /68 04/11/25 13:11   Temp 37.6 04/11/25 13:11   Pulse 76 04/11/25 13:11   Resp 16 04/11/25 11:49   SpO2 100 04/11/25 13:11         No case tracking events are documented in the log.      Pain/Ravindra Score: Pain Rating Prior to Med Admin: 6 (4/11/2025 11:49 AM)  Pain Rating Post Med Admin: 2 (4/11/2025 12:48 PM)

## 2025-04-11 NOTE — ANESTHESIA PREPROCEDURE EVALUATION
04/11/2025  Lincoln Vizcarra is a 64 y.o., male, who presents for the following:    Procedure: [id number] (Chest)   Anesthesia type: General   Diagnosis: Cancer, metastatic to bone [C79.51]   Pre-op diagnosis: Cancer, metastatic to bone [C79.51]   Location: [facility] OR 04 / [facility] OR   Surgeons: Kehinde King MD       History of Present Illness:      Lincoln Vizcarra is a 63 y/o male with no known medical history presented to [facility] ED on 3/20/25 due to abdominal bloating and pain for several months. CT Abd/Pelvis revealed a sigmoid mass compatible with malignancy along with extensive hepatic metastatic disease.       He presented to the ED on 4/7 with complaints of lower back pain, constipation and abdominal bloating. CT chest abdomen pelvis w/ confirmed a circumferential, apple-core type malignant-appearing mas in the sigmoid colon, bone metastasis in the sacrum, along with multiple liver metastasis. Flexible sigmoidoscopy was performed yesterday and biopsy was taken from the partially obstructing large mass in the recto-sigmoid. The pathology revealed adenocarcinoma. : 92, .      He endorses an 80 lb weight loss over the past 6 months due to low appetite and trying to avoid abdominal bloating and pain. He states he has been having soft blood tinged bowel movements since starting on softeners while admitted. He denies nausea, vomiting at this time.    LAB:        Pre-op Assessment    I have reviewed the Patient Summary Reports.     I have reviewed the Nursing Notes. I have reviewed the NPO Status.   I have reviewed the Medications.     Review of Systems  Anesthesia Hx:  No problems with previous Anesthesia             Denies Family Hx of Anesthesia complications.    Denies Personal Hx of Anesthesia complications.                    Social:  Smoker       Pulmonary:  Pulmonary  Normal                       Neurological:           Bell's Palsy                            Endocrine:  Endocrine Normal                Physical Exam  General: Alert, Oriented and Cachexia    Airway:  Mallampati: II   Mouth Opening: Normal  TM Distance: Normal  Tongue: Normal  Neck ROM: Normal ROM    Dental:  Intact, Periodontal disease, Loose teeth  Missing numerous teeth, remaining teeth decayed/exposure roots/some loose / several broken teeth  Chest/Lungs:  Normal Respiratory Rate    Heart:  Rate: Normal  Rhythm: Regular Rhythm        Anesthesia Plan  Type of Anesthesia, risks & benefits discussed:    Anesthesia Type: Gen Natural Airway  Intra-op Monitoring Plan: Standard ASA Monitors  Post Op Pain Control Plan: IV/PO Opioids PRN  Induction:  IV  Airway Plan: Direct  Informed Consent: Informed consent signed with the Patient and all parties understand the risks and agree with anesthesia plan.  All questions answered. Patient consented to blood products? No  ASA Score: 3  Day of Surgery Review of History & Physical: H&P Update referred to the surgeon/provider.  Anesthesia Plan Notes: Nasal cannula vs facemask supplemental oxygenation   TIVA, possible GA/LMA, if necessary      Ready For Surgery From Anesthesia Perspective.     .

## 2025-04-11 NOTE — NURSING
Discharge instructions given to pt and spouse. Mediport ID card given to pt. Reviewed discharged papers w/ pt and spouse. IV removed, NAD. Pt placed in transport.

## 2025-04-14 ENCOUNTER — CLINICAL SUPPORT (OUTPATIENT)
Dept: HEMATOLOGY/ONCOLOGY | Facility: CLINIC | Age: 65
End: 2025-04-14
Payer: COMMERCIAL

## 2025-04-14 ENCOUNTER — LAB VISIT (OUTPATIENT)
Dept: LAB | Facility: HOSPITAL | Age: 65
End: 2025-04-14
Payer: COMMERCIAL

## 2025-04-14 ENCOUNTER — OFFICE VISIT (OUTPATIENT)
Dept: HEMATOLOGY/ONCOLOGY | Facility: CLINIC | Age: 65
End: 2025-04-14
Payer: COMMERCIAL

## 2025-04-14 ENCOUNTER — PATIENT OUTREACH (OUTPATIENT)
Dept: ADMINISTRATIVE | Facility: CLINIC | Age: 65
End: 2025-04-14
Payer: COMMERCIAL

## 2025-04-14 VITALS
WEIGHT: 123 LBS | SYSTOLIC BLOOD PRESSURE: 116 MMHG | HEART RATE: 93 BPM | DIASTOLIC BLOOD PRESSURE: 76 MMHG | BODY MASS INDEX: 16.66 KG/M2 | HEIGHT: 72 IN | TEMPERATURE: 98 F | OXYGEN SATURATION: 100 %

## 2025-04-14 DIAGNOSIS — C18.9 MALIGNANT NEOPLASM OF COLON, UNSPECIFIED PART OF COLON: Primary | ICD-10-CM

## 2025-04-14 DIAGNOSIS — C18.9 MALIGNANT NEOPLASM OF COLON, UNSPECIFIED PART OF COLON: ICD-10-CM

## 2025-04-14 LAB
ALBUMIN SERPL-MCNC: 3 G/DL (ref 3.4–4.8)
ALBUMIN/GLOB SERPL: 0.6 RATIO (ref 1.1–2)
ALP SERPL-CCNC: 904 UNIT/L (ref 40–150)
ALT SERPL-CCNC: 113 UNIT/L (ref 0–55)
ANION GAP SERPL CALC-SCNC: 14 MEQ/L
AST SERPL-CCNC: 92 UNIT/L (ref 11–45)
BASOPHILS # BLD AUTO: 0.03 X10(3)/MCL
BASOPHILS NFR BLD AUTO: 0.3 %
BILIRUB SERPL-MCNC: 0.8 MG/DL
BUN SERPL-MCNC: 25.8 MG/DL (ref 8.4–25.7)
CALCIUM SERPL-MCNC: 10.1 MG/DL (ref 8.8–10)
CHLORIDE SERPL-SCNC: 100 MMOL/L (ref 98–107)
CO2 SERPL-SCNC: 24 MMOL/L (ref 23–31)
CREAT SERPL-MCNC: 0.72 MG/DL (ref 0.72–1.25)
CREAT/UREA NIT SERPL: 36
EOSINOPHIL # BLD AUTO: 0.03 X10(3)/MCL (ref 0–0.9)
EOSINOPHIL NFR BLD AUTO: 0.3 %
ERYTHROCYTE [DISTWIDTH] IN BLOOD BY AUTOMATED COUNT: 12.8 % (ref 11.5–17)
GFR SERPLBLD CREATININE-BSD FMLA CKD-EPI: >60 ML/MIN/1.73/M2
GLOBULIN SER-MCNC: 5 GM/DL (ref 2.4–3.5)
GLUCOSE SERPL-MCNC: 114 MG/DL (ref 82–115)
HCT VFR BLD AUTO: 37.3 % (ref 42–52)
HGB BLD-MCNC: 12.5 G/DL (ref 14–18)
IMM GRANULOCYTES # BLD AUTO: 0.03 X10(3)/MCL (ref 0–0.04)
IMM GRANULOCYTES NFR BLD AUTO: 0.3 %
LYMPHOCYTES # BLD AUTO: 1.43 X10(3)/MCL (ref 0.6–4.6)
LYMPHOCYTES NFR BLD AUTO: 13 %
MAGNESIUM SERPL-MCNC: 2.5 MG/DL (ref 1.6–2.6)
MCH RBC QN AUTO: 31.9 PG (ref 27–31)
MCHC RBC AUTO-ENTMCNC: 33.5 G/DL (ref 33–36)
MCV RBC AUTO: 95.2 FL (ref 80–94)
MONOCYTES # BLD AUTO: 1.27 X10(3)/MCL (ref 0.1–1.3)
MONOCYTES NFR BLD AUTO: 11.6 %
NEUTROPHILS # BLD AUTO: 8.17 X10(3)/MCL (ref 2.1–9.2)
NEUTROPHILS NFR BLD AUTO: 74.5 %
PLATELET # BLD AUTO: 481 X10(3)/MCL (ref 130–400)
PMV BLD AUTO: 9.3 FL (ref 7.4–10.4)
POTASSIUM SERPL-SCNC: 4.4 MMOL/L (ref 3.5–5.1)
PROT SERPL-MCNC: 8 GM/DL (ref 5.8–7.6)
RBC # BLD AUTO: 3.92 X10(6)/MCL (ref 4.7–6.1)
SODIUM SERPL-SCNC: 138 MMOL/L (ref 136–145)
WBC # BLD AUTO: 10.96 X10(3)/MCL (ref 4.5–11.5)

## 2025-04-14 PROCEDURE — 80053 COMPREHEN METABOLIC PANEL: CPT

## 2025-04-14 PROCEDURE — 1111F DSCHRG MED/CURRENT MED MERGE: CPT | Mod: CPTII,S$GLB,,

## 2025-04-14 PROCEDURE — 3288F FALL RISK ASSESSMENT DOCD: CPT | Mod: CPTII,S$GLB,,

## 2025-04-14 PROCEDURE — 99999 PR PBB SHADOW E&M-EST. PATIENT-LVL I: CPT | Mod: PBBFAC,,,

## 2025-04-14 PROCEDURE — 3078F DIAST BP <80 MM HG: CPT | Mod: CPTII,S$GLB,,

## 2025-04-14 PROCEDURE — 3008F BODY MASS INDEX DOCD: CPT | Mod: CPTII,S$GLB,,

## 2025-04-14 PROCEDURE — 3074F SYST BP LT 130 MM HG: CPT | Mod: CPTII,S$GLB,,

## 2025-04-14 PROCEDURE — 99215 OFFICE O/P EST HI 40 MIN: CPT | Mod: S$GLB,,,

## 2025-04-14 PROCEDURE — 36415 COLL VENOUS BLD VENIPUNCTURE: CPT

## 2025-04-14 PROCEDURE — 1159F MED LIST DOCD IN RCRD: CPT | Mod: CPTII,S$GLB,,

## 2025-04-14 PROCEDURE — 83735 ASSAY OF MAGNESIUM: CPT

## 2025-04-14 PROCEDURE — 1101F PT FALLS ASSESS-DOCD LE1/YR: CPT | Mod: CPTII,S$GLB,,

## 2025-04-14 PROCEDURE — 99999 PR PBB SHADOW E&M-EST. PATIENT-LVL IV: CPT | Mod: PBBFAC,,,

## 2025-04-14 PROCEDURE — 85025 COMPLETE CBC W/AUTO DIFF WBC: CPT

## 2025-04-14 PROCEDURE — 1126F AMNT PAIN NOTED NONE PRSNT: CPT | Mod: CPTII,S$GLB,,

## 2025-04-14 RX ORDER — PROCHLORPERAZINE EDISYLATE 5 MG/ML
5 INJECTION INTRAMUSCULAR; INTRAVENOUS ONCE AS NEEDED
Status: CANCELLED | OUTPATIENT
Start: 2025-04-17

## 2025-04-14 RX ORDER — FLUOROURACIL 50 MG/ML
400 INJECTION, SOLUTION INTRAVENOUS
Status: CANCELLED | OUTPATIENT
Start: 2025-04-15

## 2025-04-14 RX ORDER — HEPARIN 100 UNIT/ML
500 SYRINGE INTRAVENOUS
Status: CANCELLED | OUTPATIENT
Start: 2025-04-15

## 2025-04-14 RX ORDER — DIPHENHYDRAMINE HYDROCHLORIDE 50 MG/ML
50 INJECTION, SOLUTION INTRAMUSCULAR; INTRAVENOUS ONCE AS NEEDED
Status: CANCELLED | OUTPATIENT
Start: 2025-04-15

## 2025-04-14 RX ORDER — EPINEPHRINE 0.3 MG/.3ML
0.3 INJECTION SUBCUTANEOUS ONCE AS NEEDED
Status: CANCELLED | OUTPATIENT
Start: 2025-04-15

## 2025-04-14 RX ORDER — SODIUM CHLORIDE 0.9 % (FLUSH) 0.9 %
10 SYRINGE (ML) INJECTION
Status: CANCELLED | OUTPATIENT
Start: 2025-04-17

## 2025-04-14 RX ORDER — PROCHLORPERAZINE MALEATE 5 MG
5 TABLET ORAL EVERY 6 HOURS PRN
Qty: 20 TABLET | Refills: 5 | Status: SHIPPED | OUTPATIENT
Start: 2025-04-14

## 2025-04-14 RX ORDER — PROCHLORPERAZINE EDISYLATE 5 MG/ML
5 INJECTION INTRAMUSCULAR; INTRAVENOUS ONCE AS NEEDED
Status: CANCELLED | OUTPATIENT
Start: 2025-04-15

## 2025-04-14 RX ORDER — HEPARIN 100 UNIT/ML
500 SYRINGE INTRAVENOUS
Status: CANCELLED | OUTPATIENT
Start: 2025-04-17

## 2025-04-14 RX ORDER — POLYETHYLENE GLYCOL 3350 17 G/17G
POWDER, FOR SOLUTION ORAL
COMMUNITY
Start: 2025-04-11

## 2025-04-14 RX ORDER — OLANZAPINE 5 MG/1
5 TABLET, FILM COATED ORAL NIGHTLY
Qty: 6 TABLET | Refills: 11 | Status: SHIPPED | OUTPATIENT
Start: 2025-04-14

## 2025-04-14 RX ORDER — SODIUM CHLORIDE 0.9 % (FLUSH) 0.9 %
10 SYRINGE (ML) INJECTION
Status: CANCELLED | OUTPATIENT
Start: 2025-04-15

## 2025-04-14 RX ORDER — DIPHENHYDRAMINE HYDROCHLORIDE 50 MG/ML
25 INJECTION, SOLUTION INTRAMUSCULAR; INTRAVENOUS
Status: CANCELLED
Start: 2025-04-15

## 2025-04-14 NOTE — NURSING
"Oncology Navigation   Intake  Date of Diagnosis: 25  Cancer Type: GI (Colon with liver mets)     Treatment  Current Status: Active       Medical Oncologist: Dr. Arthur Hill  Consult Date: 25  Chemotherapy: Planned  Chemotherapy Regimen: OP GI [id number] (oxaliplatin leucovorin fluorouracil) Q2W       Procedures: Port / PICC  Port / PICC Schedule Date: 25    General Referrals: Andrés Maher          Support Systems: Spouse/significant other; Family members  Barriers of Care: Dietary / Nutrition  Dietary / Nutrition: Weight loss / gain     Acuity  Stage: 2  Treatment Tolerability: Has not started treatment yet/treatment fully completed and side effects resolved  ECO  Comorbidities in Medical History: 1  Hospitalization Within the Past Month: 1   Needed: 0  Support: 0  Verbalizes Financial Concerns: 0  Transportation: 0  Mental Health: PHQ Score: 0 (Distress 0/10)  Psychological Factors (+1 each): Other  History of noncompliance/frequent no shows and cancellations: 0  Verbalizes the need for more education: 0  Other Factors (+1 for Each): 0  Navigation Acuity: 7     Follow Up  No follow-ups on file.      Met with patient today following education visit. He is accompanied by his significant other. Introduced self and role of navigation. Assessed for barriers to care. He denies financial or transportation concerns. He denies need for medical equipment or home health. His significant other who is with him today is very supportive. When asked about anxiety and depression he denies but states that he is "frustrated" with his diagnosis. Provided emotional support. We discussed meeting with LCSW, but he would prefer to hold off for right now. He has no further questions or concerns today. He is aware of how to reach navigation should he need to.     "

## 2025-04-14 NOTE — PROGRESS NOTES
Patient receiving chemotherapy and not applicable for a TCC post hospital discharge follow up call.
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
## 2025-04-14 NOTE — PROGRESS NOTES
THERAPY EDUCATION: FOLFOX     Chief Complaint: Therapy Education      Diagnosis: Colon cancer        Current Treatment: FOLFOX Q2W to start on 4/15/25.     Interval History      4/14/25: Mr. Vizcarra presents to the clinic accompanied by his significant other for therapy education.Patient reports no major complaints at today's visit. Denies fever, chills, N/V/D, constipation, recent infection, chest pain or unexplained bleeding or bruising.    PLAN   -Mediport placement completed on 4/11/25. Lidocaine cream given with instructions to apply on port 30 minutes before treatment.   -Therapy education completed on 4/14/25.  -Plans to start FOLFOX Q2W to start on 4/15/25. Made patient aware that he will receive premedications given 30 minutes prior to each treatment to help prevent nausea. Patient understood that he will be going home with a 5FU pump for 2 days and will need to return to the clinic to have pump disconnected. Patient aware the importance of monitoring the pump 3 to 4 times a day to check for leaks or kinks. Patient also aware of the importance of avoiding cold drinks and cold food on the day of and 2 days after treatment with Oxaliplatin.     -Antiemetics regimen schedule made and given with calendar for guidance     Olanzapine- Take 1 tablet by mouth on Days 1-3 of each chemo cycle   -Compazine PRN prescribed for at home with instructions to be taken by mouth every 6 hours as needed for nausea.   -OTC Imodium AD recommended for diarrhea (4-6 BMs a day). Take 2 tablets after the first loose bowel movement, and 1 tablet after each loose bowel movement after the first dose has been taken. No more than 4 tablets should be taken in any 24-hour period. If not working, Lomotil can be prescribed as 2nd choice.    -Mouth sore prevention with 1-quart warm water with 1 tsp of baking soda and salt and alcohol-free mouthwash.   -Emphasized adequate hand-hygiene and limited contact with people who are sick.    -Monitor and notify any bleeding in urine, stool, or sputum.  As well as unusual bleeding or bruising and stomach pain.   - Emphasized hydration with 4 16 oz bottles of water a day.    -Importance of moisturizing with fragrance free lotion to prevent skin rash and/or hand-foot syndrome.  -Call clinic if fever >100.4, shakes or chills, shortness of breath, chest pain, uncontrolled vomiting or diarrhea, pain and tingling in the chest or arm, or just not feeling well.    -Plans to RTC same day labs and toxicity check with MD or NP.      DISCUSSION:    1.  A total of 60 minutes were spent in counseling today, in which 100% were face-to-face.  At today's therapy teaching session, we discussed the patient's cancer diagnosis as well as planned therapy regimen, protocol, side effects and toxicities.  A handout of each therapeutic agent in the regimen was provided and reviewed in detail.    2.  The following side effects were discussed but not limited to:    Fluorouracil Side Effects:  Allergic Reactions  Breathing Problems  Bruising  Chest Pain  Chills  Cough  Diarrhea  Fever  Hair Loss  Headache  Infection  Itching  Low Blood Counts  Mouth Sores  Nausea  Numbness  Pain  Rash  Stomach Upset  Swelling  Tingling  Vomiting    Leucovorin (Injection) Side Effects:  Allergic Reactions  Breathing Problems  Itching  Rash  Swelling    Oxaliplatin Side Effects:  Allergic Reactions  Anemia  Breathing Problems  Bruising  Chest Pain  Chills  Cough  Diarrhea  Dizziness  Feeling Faint  Fever  Gas  Hair Loss  Infection  Injury  Itching  Low Blood Counts  Mouth Sores  Muscle Pain  Nausea  Numbness  Pain  Rash  Swelling  Tingling  Vomiting  Cold Sensitivity                a.  Discussed the risk of infection while on therapy related to pancytopenia, specifically a decrease in their white blood cell count.  Instructed to contact our office for temperature >100.4 F, chills, sudden onset cough or shortness of breath, symptoms of a urinary  tract infection.                b.  Discussed the risk of anemia. Instructed to contact our office for dizziness, heart palpitations, or extreme or sudden changes in weakness.                c.  Discussed the risk of thrombocytopenia, which increases the risk of bruising or bleeding.  Instructed the patient to contact our office for spontaneous signs of bleeding, including nose bleeds, bleeding from the gums or mouth, blood in sputum, urine or stool and unusual or excessive bruising or rash.                d.  Discussed GI side effects including weight changes, changes in appetite, altered sense of taste, stomatitis, nausea, vomiting, diarrhea, constipation, and heartburn.                e.  Discussed  side effects including painful urination, changes in the amount of urination, possible urine color changes.  Discussed fertility issues and to prevent  pregnancy if of child bearing age.                f.  Discussed neurological side effects including the risk of peripheral neuropathy, either temporary or permanent.                g.  Discussed the potential for skin, hair, and nail changes.       3.  Instructed to contact our office for discussion of medication changes, the addition of vitamin and/or herbal supplementation as they may interact with some chemotherapy agents.    4.  Discussed dietary modifications and the need to maintain adequate caloric intake and proper oral hydration.  Recommended 64 ounces of fluid per day.    5.  Discussed anti-emetic protocol and bowel regimen protocol.    6.  Office contact information given including after hours number.  Discussed there is an oncologist on call 24/7, 365 days including weekends.  Provided primary nurse's information .    7.  In summary, the patient is in agreement with the plan of care.  Questions appeared to be answered to their satisfaction. Consented the patient to the treatment plan and the patient was educated on the planned duration of the treatment  and schedule of the treatment administration. Copy to be scanned into the chart.    All questions answered to the satisfaction of the patient and family.     Follow up appointments given to ewelina ORELLANA  PATIENT EDUCATOR  [facility]

## 2025-04-15 ENCOUNTER — INFUSION (OUTPATIENT)
Dept: INFUSION THERAPY | Facility: HOSPITAL | Age: 65
End: 2025-04-15
Attending: INTERNAL MEDICINE
Payer: COMMERCIAL

## 2025-04-15 VITALS
TEMPERATURE: 98 F | HEIGHT: 72 IN | SYSTOLIC BLOOD PRESSURE: 105 MMHG | BODY MASS INDEX: 16.66 KG/M2 | DIASTOLIC BLOOD PRESSURE: 68 MMHG | HEART RATE: 100 BPM | WEIGHT: 123 LBS | OXYGEN SATURATION: 98 %

## 2025-04-15 DIAGNOSIS — C18.9 MALIGNANT NEOPLASM OF COLON, UNSPECIFIED PART OF COLON: Primary | ICD-10-CM

## 2025-04-15 PROCEDURE — 96416 CHEMO PROLONG INFUSE W/PUMP: CPT

## 2025-04-15 PROCEDURE — 25000003 PHARM REV CODE 250: Performed by: INTERNAL MEDICINE

## 2025-04-15 PROCEDURE — 96413 CHEMO IV INFUSION 1 HR: CPT

## 2025-04-15 PROCEDURE — 96367 TX/PROPH/DG ADDL SEQ IV INF: CPT

## 2025-04-15 PROCEDURE — 63600175 PHARM REV CODE 636 W HCPCS: Performed by: INTERNAL MEDICINE

## 2025-04-15 PROCEDURE — 96415 CHEMO IV INFUSION ADDL HR: CPT

## 2025-04-15 PROCEDURE — 96411 CHEMO IV PUSH ADDL DRUG: CPT

## 2025-04-15 PROCEDURE — 96375 TX/PRO/DX INJ NEW DRUG ADDON: CPT

## 2025-04-15 PROCEDURE — 96368 THER/DIAG CONCURRENT INF: CPT

## 2025-04-15 RX ORDER — SODIUM CHLORIDE 0.9 % (FLUSH) 0.9 %
10 SYRINGE (ML) INJECTION
Status: DISCONTINUED | OUTPATIENT
Start: 2025-04-15 | End: 2025-04-15 | Stop reason: HOSPADM

## 2025-04-15 RX ORDER — EPINEPHRINE 0.3 MG/.3ML
0.3 INJECTION SUBCUTANEOUS ONCE AS NEEDED
Status: DISCONTINUED | OUTPATIENT
Start: 2025-04-15 | End: 2025-04-15 | Stop reason: HOSPADM

## 2025-04-15 RX ORDER — DIPHENHYDRAMINE HYDROCHLORIDE 50 MG/ML
50 INJECTION, SOLUTION INTRAMUSCULAR; INTRAVENOUS ONCE AS NEEDED
Status: DISCONTINUED | OUTPATIENT
Start: 2025-04-15 | End: 2025-04-15 | Stop reason: HOSPADM

## 2025-04-15 RX ORDER — HEPARIN 100 UNIT/ML
500 SYRINGE INTRAVENOUS
Status: DISCONTINUED | OUTPATIENT
Start: 2025-04-15 | End: 2025-04-15 | Stop reason: HOSPADM

## 2025-04-15 RX ORDER — DIPHENHYDRAMINE HYDROCHLORIDE 50 MG/ML
25 INJECTION, SOLUTION INTRAMUSCULAR; INTRAVENOUS
Status: COMPLETED | OUTPATIENT
Start: 2025-04-15 | End: 2025-04-15

## 2025-04-15 RX ORDER — PROCHLORPERAZINE EDISYLATE 5 MG/ML
5 INJECTION INTRAMUSCULAR; INTRAVENOUS ONCE AS NEEDED
Status: DISCONTINUED | OUTPATIENT
Start: 2025-04-15 | End: 2025-04-15 | Stop reason: HOSPADM

## 2025-04-15 RX ORDER — FLUOROURACIL 50 MG/ML
400 INJECTION, SOLUTION INTRAVENOUS
Status: COMPLETED | OUTPATIENT
Start: 2025-04-15 | End: 2025-04-15

## 2025-04-15 RX ADMIN — FLUOROURACIL 670 MG: 50 INJECTION, SOLUTION INTRAVENOUS at 11:04

## 2025-04-15 RX ADMIN — DEXTROSE MONOHYDRATE 142 MG: 25000 INJECTION, SOLUTION INTRAVENOUS at 09:04

## 2025-04-15 RX ADMIN — SODIUM CHLORIDE: 9 INJECTION, SOLUTION INTRAVENOUS at 09:04

## 2025-04-15 RX ADMIN — DEXAMETHASONE SODIUM PHOSPHATE 0.25 MG: 4 INJECTION, SOLUTION INTRA-ARTICULAR; INTRALESIONAL; INTRAMUSCULAR; INTRAVENOUS; SOFT TISSUE at 09:04

## 2025-04-15 RX ADMIN — LEUCOVORIN CALCIUM 650 MG: 200 INJECTION, POWDER, LYOPHILIZED, FOR SOLUTION INTRAMUSCULAR; INTRAVENOUS at 09:04

## 2025-04-15 RX ADMIN — FLUOROURACIL 4000 MG: 50 INJECTION, SOLUTION INTRAVENOUS at 11:04

## 2025-04-15 RX ADMIN — DIPHENHYDRAMINE HYDROCHLORIDE 25 MG: 50 INJECTION INTRAMUSCULAR; INTRAVENOUS at 09:04

## 2025-04-15 RX ADMIN — DEXTROSE MONOHYDRATE: 50 INJECTION, SOLUTION INTRAVENOUS at 09:04

## 2025-04-15 NOTE — PLAN OF CARE
C1 D1 FOLFOX given, tolerated well. Discharged in stable condition and is aware of future appointments.

## 2025-04-17 ENCOUNTER — INFUSION (OUTPATIENT)
Dept: INFUSION THERAPY | Facility: HOSPITAL | Age: 65
End: 2025-04-17
Attending: INTERNAL MEDICINE
Payer: COMMERCIAL

## 2025-04-17 VITALS
DIASTOLIC BLOOD PRESSURE: 77 MMHG | TEMPERATURE: 98 F | WEIGHT: 123 LBS | BODY MASS INDEX: 16.66 KG/M2 | HEART RATE: 116 BPM | SYSTOLIC BLOOD PRESSURE: 111 MMHG | RESPIRATION RATE: 16 BRPM | HEIGHT: 72 IN

## 2025-04-17 DIAGNOSIS — C18.9 MALIGNANT NEOPLASM OF COLON, UNSPECIFIED PART OF COLON: Primary | ICD-10-CM

## 2025-04-17 PROCEDURE — 25000003 PHARM REV CODE 250: Performed by: INTERNAL MEDICINE

## 2025-04-17 PROCEDURE — A4216 STERILE WATER/SALINE, 10 ML: HCPCS | Performed by: INTERNAL MEDICINE

## 2025-04-17 PROCEDURE — 25000003 PHARM REV CODE 250: Performed by: NURSE PRACTITIONER

## 2025-04-17 PROCEDURE — 63600175 PHARM REV CODE 636 W HCPCS: Performed by: INTERNAL MEDICINE

## 2025-04-17 PROCEDURE — 96360 HYDRATION IV INFUSION INIT: CPT

## 2025-04-17 RX ORDER — HEPARIN 100 UNIT/ML
500 SYRINGE INTRAVENOUS
OUTPATIENT
Start: 2025-04-17

## 2025-04-17 RX ORDER — PROCHLORPERAZINE EDISYLATE 5 MG/ML
5 INJECTION INTRAMUSCULAR; INTRAVENOUS ONCE AS NEEDED
Status: DISCONTINUED | OUTPATIENT
Start: 2025-04-17 | End: 2025-04-17 | Stop reason: HOSPADM

## 2025-04-17 RX ORDER — SODIUM CHLORIDE 0.9 % (FLUSH) 0.9 %
10 SYRINGE (ML) INJECTION
OUTPATIENT
Start: 2025-04-17

## 2025-04-17 RX ORDER — HEPARIN 100 UNIT/ML
500 SYRINGE INTRAVENOUS
Status: DISCONTINUED | OUTPATIENT
Start: 2025-04-17 | End: 2025-04-17 | Stop reason: HOSPADM

## 2025-04-17 RX ORDER — SODIUM CHLORIDE 0.9 % (FLUSH) 0.9 %
10 SYRINGE (ML) INJECTION
Status: DISCONTINUED | OUTPATIENT
Start: 2025-04-17 | End: 2025-04-17 | Stop reason: HOSPADM

## 2025-04-17 RX ADMIN — SODIUM CHLORIDE 1000 ML: 9 INJECTION, SOLUTION INTRAVENOUS at 10:04

## 2025-04-17 RX ADMIN — Medication 10 ML: at 11:04

## 2025-04-17 RX ADMIN — HEPARIN 500 UNITS: 100 SYRINGE at 11:04

## 2025-04-17 NOTE — NURSING
Pt here to dc cadd pump. Pt notes not eating/ drinking. Hr 114. Spoke with Leticia Tejada NP ok to give ivfs/ pt marquise well.       Reinforced need to drink fluids with chemo. Pt and family member verbalized understanding

## 2025-04-18 LAB
ONEOME COMMENT: NORMAL
ONEOME METHOD: NORMAL

## 2025-04-28 ENCOUNTER — LAB VISIT (OUTPATIENT)
Dept: LAB | Facility: HOSPITAL | Age: 65
End: 2025-04-28
Attending: INTERNAL MEDICINE
Payer: COMMERCIAL

## 2025-04-28 ENCOUNTER — OFFICE VISIT (OUTPATIENT)
Dept: HEMATOLOGY/ONCOLOGY | Facility: CLINIC | Age: 65
End: 2025-04-28
Payer: COMMERCIAL

## 2025-04-28 VITALS
RESPIRATION RATE: 15 BRPM | OXYGEN SATURATION: 95 % | WEIGHT: 112 LBS | DIASTOLIC BLOOD PRESSURE: 71 MMHG | SYSTOLIC BLOOD PRESSURE: 99 MMHG | BODY MASS INDEX: 15.17 KG/M2 | HEIGHT: 72 IN | HEART RATE: 103 BPM

## 2025-04-28 DIAGNOSIS — K76.9 LIVER LESION: ICD-10-CM

## 2025-04-28 DIAGNOSIS — C18.9 MALIGNANT NEOPLASM OF COLON, UNSPECIFIED PART OF COLON: ICD-10-CM

## 2025-04-28 DIAGNOSIS — C18.9 MALIGNANT NEOPLASM OF COLON, UNSPECIFIED PART OF COLON: Primary | ICD-10-CM

## 2025-04-28 DIAGNOSIS — R68.2 DRY MOUTH: ICD-10-CM

## 2025-04-28 DIAGNOSIS — G89.3 CANCER ASSOCIATED PAIN: ICD-10-CM

## 2025-04-28 DIAGNOSIS — Z79.69 ON ANTINEOPLASTIC CHEMOTHERAPY: ICD-10-CM

## 2025-04-28 LAB
ALBUMIN SERPL-MCNC: 3.1 G/DL (ref 3.4–4.8)
ALBUMIN/GLOB SERPL: 0.6 RATIO (ref 1.1–2)
ALP SERPL-CCNC: 827 UNIT/L (ref 40–150)
ALT SERPL-CCNC: 49 UNIT/L (ref 0–55)
ANION GAP SERPL CALC-SCNC: 15 MEQ/L
AST SERPL-CCNC: 64 UNIT/L (ref 11–45)
BASOPHILS # BLD AUTO: 0.05 X10(3)/MCL
BASOPHILS NFR BLD AUTO: 0.7 %
BILIRUB SERPL-MCNC: 1 MG/DL
BUN SERPL-MCNC: 29 MG/DL (ref 8.4–25.7)
CALCIUM SERPL-MCNC: 10.1 MG/DL (ref 8.8–10)
CEA SERPL-MCNC: 457.61 NG/ML (ref 0–3)
CHLORIDE SERPL-SCNC: 103 MMOL/L (ref 98–107)
CO2 SERPL-SCNC: 23 MMOL/L (ref 23–31)
CREAT SERPL-MCNC: 0.89 MG/DL (ref 0.72–1.25)
CREAT/UREA NIT SERPL: 33
EOSINOPHIL # BLD AUTO: 0.02 X10(3)/MCL (ref 0–0.9)
EOSINOPHIL NFR BLD AUTO: 0.3 %
ERYTHROCYTE [DISTWIDTH] IN BLOOD BY AUTOMATED COUNT: 14 % (ref 11.5–17)
GFR SERPLBLD CREATININE-BSD FMLA CKD-EPI: >60 ML/MIN/1.73/M2
GLOBULIN SER-MCNC: 5.2 GM/DL (ref 2.4–3.5)
GLUCOSE SERPL-MCNC: 112 MG/DL (ref 82–115)
HCT VFR BLD AUTO: 37.9 % (ref 42–52)
HGB BLD-MCNC: 12.8 G/DL (ref 14–18)
IMM GRANULOCYTES # BLD AUTO: 0.01 X10(3)/MCL (ref 0–0.04)
IMM GRANULOCYTES NFR BLD AUTO: 0.1 %
LYMPHOCYTES # BLD AUTO: 2.1 X10(3)/MCL (ref 0.6–4.6)
LYMPHOCYTES NFR BLD AUTO: 31 %
MAGNESIUM SERPL-MCNC: 2.1 MG/DL (ref 1.6–2.6)
MCH RBC QN AUTO: 32.7 PG (ref 27–31)
MCHC RBC AUTO-ENTMCNC: 33.8 G/DL (ref 33–36)
MCV RBC AUTO: 96.9 FL (ref 80–94)
MONOCYTES # BLD AUTO: 0.67 X10(3)/MCL (ref 0.1–1.3)
MONOCYTES NFR BLD AUTO: 9.9 %
NEUTROPHILS # BLD AUTO: 3.92 X10(3)/MCL (ref 2.1–9.2)
NEUTROPHILS NFR BLD AUTO: 58 %
PLATELET # BLD AUTO: 551 X10(3)/MCL (ref 130–400)
PMV BLD AUTO: 8.4 FL (ref 7.4–10.4)
POTASSIUM SERPL-SCNC: 4.2 MMOL/L (ref 3.5–5.1)
PROT SERPL-MCNC: 8.3 GM/DL (ref 5.8–7.6)
RBC # BLD AUTO: 3.91 X10(6)/MCL (ref 4.7–6.1)
SODIUM SERPL-SCNC: 141 MMOL/L (ref 136–145)
WBC # BLD AUTO: 6.77 X10(3)/MCL (ref 4.5–11.5)

## 2025-04-28 PROCEDURE — 80053 COMPREHEN METABOLIC PANEL: CPT

## 2025-04-28 PROCEDURE — 3078F DIAST BP <80 MM HG: CPT | Mod: CPTII,S$GLB,, | Performed by: NURSE PRACTITIONER

## 2025-04-28 PROCEDURE — 1111F DSCHRG MED/CURRENT MED MERGE: CPT | Mod: CPTII,S$GLB,, | Performed by: NURSE PRACTITIONER

## 2025-04-28 PROCEDURE — 3288F FALL RISK ASSESSMENT DOCD: CPT | Mod: CPTII,S$GLB,, | Performed by: NURSE PRACTITIONER

## 2025-04-28 PROCEDURE — G2211 COMPLEX E/M VISIT ADD ON: HCPCS | Mod: S$GLB,,, | Performed by: NURSE PRACTITIONER

## 2025-04-28 PROCEDURE — 1159F MED LIST DOCD IN RCRD: CPT | Mod: CPTII,S$GLB,, | Performed by: NURSE PRACTITIONER

## 2025-04-28 PROCEDURE — 82378 CARCINOEMBRYONIC ANTIGEN: CPT

## 2025-04-28 PROCEDURE — 36415 COLL VENOUS BLD VENIPUNCTURE: CPT

## 2025-04-28 PROCEDURE — 1160F RVW MEDS BY RX/DR IN RCRD: CPT | Mod: CPTII,S$GLB,, | Performed by: NURSE PRACTITIONER

## 2025-04-28 PROCEDURE — 1101F PT FALLS ASSESS-DOCD LE1/YR: CPT | Mod: CPTII,S$GLB,, | Performed by: NURSE PRACTITIONER

## 2025-04-28 PROCEDURE — 99999 PR PBB SHADOW E&M-EST. PATIENT-LVL III: CPT | Mod: PBBFAC,,, | Performed by: NURSE PRACTITIONER

## 2025-04-28 PROCEDURE — 3008F BODY MASS INDEX DOCD: CPT | Mod: CPTII,S$GLB,, | Performed by: NURSE PRACTITIONER

## 2025-04-28 PROCEDURE — 99215 OFFICE O/P EST HI 40 MIN: CPT | Mod: S$GLB,,, | Performed by: NURSE PRACTITIONER

## 2025-04-28 PROCEDURE — 83735 ASSAY OF MAGNESIUM: CPT

## 2025-04-28 PROCEDURE — 85025 COMPLETE CBC W/AUTO DIFF WBC: CPT

## 2025-04-28 PROCEDURE — 3074F SYST BP LT 130 MM HG: CPT | Mod: CPTII,S$GLB,, | Performed by: NURSE PRACTITIONER

## 2025-04-28 RX ORDER — PROCHLORPERAZINE EDISYLATE 5 MG/ML
5 INJECTION INTRAMUSCULAR; INTRAVENOUS ONCE AS NEEDED
Status: CANCELLED | OUTPATIENT
Start: 2025-04-29

## 2025-04-28 RX ORDER — DIPHENHYDRAMINE HYDROCHLORIDE 50 MG/ML
50 INJECTION, SOLUTION INTRAMUSCULAR; INTRAVENOUS ONCE AS NEEDED
Status: CANCELLED | OUTPATIENT
Start: 2025-04-29

## 2025-04-28 RX ORDER — PROCHLORPERAZINE EDISYLATE 5 MG/ML
5 INJECTION INTRAMUSCULAR; INTRAVENOUS ONCE AS NEEDED
Status: CANCELLED | OUTPATIENT
Start: 2025-05-01

## 2025-04-28 RX ORDER — OXYCODONE AND ACETAMINOPHEN 10; 325 MG/1; MG/1
1 TABLET ORAL EVERY 6 HOURS PRN
Qty: 120 TABLET | Refills: 0 | Status: SHIPPED | OUTPATIENT
Start: 2025-04-28

## 2025-04-28 RX ORDER — FLUOROURACIL 50 MG/ML
400 INJECTION, SOLUTION INTRAVENOUS
Status: CANCELLED | OUTPATIENT
Start: 2025-04-29

## 2025-04-28 RX ORDER — HEPARIN 100 UNIT/ML
500 SYRINGE INTRAVENOUS
Status: CANCELLED | OUTPATIENT
Start: 2025-04-29

## 2025-04-28 RX ORDER — EPINEPHRINE 0.3 MG/.3ML
0.3 INJECTION SUBCUTANEOUS ONCE AS NEEDED
Status: CANCELLED | OUTPATIENT
Start: 2025-04-29

## 2025-04-28 RX ORDER — SODIUM CHLORIDE 0.9 % (FLUSH) 0.9 %
10 SYRINGE (ML) INJECTION
Status: CANCELLED | OUTPATIENT
Start: 2025-05-01

## 2025-04-28 RX ORDER — DIPHENHYDRAMINE HYDROCHLORIDE 50 MG/ML
25 INJECTION, SOLUTION INTRAMUSCULAR; INTRAVENOUS
Status: CANCELLED
Start: 2025-04-29

## 2025-04-28 RX ORDER — SODIUM CHLORIDE 0.9 % (FLUSH) 0.9 %
10 SYRINGE (ML) INJECTION
Status: CANCELLED | OUTPATIENT
Start: 2025-04-29

## 2025-04-28 RX ORDER — HEPARIN 100 UNIT/ML
500 SYRINGE INTRAVENOUS
Status: CANCELLED | OUTPATIENT
Start: 2025-05-01
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
## 2025-04-29 ENCOUNTER — CLINICAL SUPPORT (OUTPATIENT)
Dept: HEMATOLOGY/ONCOLOGY | Facility: CLINIC | Age: 65
End: 2025-04-29
Payer: COMMERCIAL

## 2025-04-29 ENCOUNTER — INFUSION (OUTPATIENT)
Dept: INFUSION THERAPY | Facility: HOSPITAL | Age: 65
End: 2025-04-29
Attending: INTERNAL MEDICINE
Payer: COMMERCIAL

## 2025-04-29 VITALS
SYSTOLIC BLOOD PRESSURE: 102 MMHG | HEART RATE: 93 BPM | BODY MASS INDEX: 15.39 KG/M2 | DIASTOLIC BLOOD PRESSURE: 72 MMHG | WEIGHT: 113.5 LBS

## 2025-04-29 DIAGNOSIS — C18.9 MALIGNANT NEOPLASM OF COLON, UNSPECIFIED PART OF COLON: Primary | ICD-10-CM

## 2025-04-29 PROCEDURE — 96416 CHEMO PROLONG INFUSE W/PUMP: CPT

## 2025-04-29 PROCEDURE — 96413 CHEMO IV INFUSION 1 HR: CPT

## 2025-04-29 PROCEDURE — 63600175 PHARM REV CODE 636 W HCPCS: Performed by: NURSE PRACTITIONER

## 2025-04-29 PROCEDURE — 96415 CHEMO IV INFUSION ADDL HR: CPT

## 2025-04-29 PROCEDURE — 96411 CHEMO IV PUSH ADDL DRUG: CPT

## 2025-04-29 PROCEDURE — 25000003 PHARM REV CODE 250: Performed by: NURSE PRACTITIONER

## 2025-04-29 PROCEDURE — 99999 PR PBB SHADOW E&M-EST. PATIENT-LVL I: CPT | Mod: PBBFAC,,,

## 2025-04-29 PROCEDURE — 96368 THER/DIAG CONCURRENT INF: CPT

## 2025-04-29 PROCEDURE — 96375 TX/PRO/DX INJ NEW DRUG ADDON: CPT

## 2025-04-29 RX ORDER — SODIUM CHLORIDE 0.9 % (FLUSH) 0.9 %
10 SYRINGE (ML) INJECTION
Status: DISCONTINUED | OUTPATIENT
Start: 2025-04-29 | End: 2025-04-29 | Stop reason: HOSPADM

## 2025-04-29 RX ORDER — FLUOROURACIL 50 MG/ML
400 INJECTION, SOLUTION INTRAVENOUS
Status: COMPLETED | OUTPATIENT
Start: 2025-04-29 | End: 2025-04-29

## 2025-04-29 RX ORDER — DIPHENHYDRAMINE HYDROCHLORIDE 50 MG/ML
25 INJECTION, SOLUTION INTRAMUSCULAR; INTRAVENOUS
Status: COMPLETED | OUTPATIENT
Start: 2025-04-29 | End: 2025-04-29

## 2025-04-29 RX ORDER — MIRTAZAPINE 7.5 MG/1
7.5 TABLET, FILM COATED ORAL NIGHTLY
Qty: 30 TABLET | Refills: 1 | Status: SHIPPED | OUTPATIENT
Start: 2025-04-29 | End: 2026-04-29

## 2025-04-29 RX ORDER — EPINEPHRINE 0.3 MG/.3ML
0.3 INJECTION SUBCUTANEOUS ONCE AS NEEDED
Status: DISCONTINUED | OUTPATIENT
Start: 2025-04-29 | End: 2025-04-29 | Stop reason: HOSPADM

## 2025-04-29 RX ORDER — DIPHENHYDRAMINE HYDROCHLORIDE 50 MG/ML
50 INJECTION, SOLUTION INTRAMUSCULAR; INTRAVENOUS ONCE AS NEEDED
Status: DISCONTINUED | OUTPATIENT
Start: 2025-04-29 | End: 2025-04-29 | Stop reason: HOSPADM

## 2025-04-29 RX ORDER — HEPARIN 100 UNIT/ML
500 SYRINGE INTRAVENOUS
Status: DISCONTINUED | OUTPATIENT
Start: 2025-04-29 | End: 2025-04-29 | Stop reason: HOSPADM

## 2025-04-29 RX ORDER — PROCHLORPERAZINE EDISYLATE 5 MG/ML
5 INJECTION INTRAMUSCULAR; INTRAVENOUS ONCE AS NEEDED
Status: DISCONTINUED | OUTPATIENT
Start: 2025-04-29 | End: 2025-04-29 | Stop reason: HOSPADM

## 2025-04-29 RX ADMIN — LEUCOVORIN CALCIUM 650 MG: 200 INJECTION, POWDER, LYOPHILIZED, FOR SOLUTION INTRAMUSCULAR; INTRAVENOUS at 09:04

## 2025-04-29 RX ADMIN — OXALIPLATIN 142 MG: 5 INJECTION, SOLUTION INTRAVENOUS at 09:04

## 2025-04-29 RX ADMIN — DEXAMETHASONE SODIUM PHOSPHATE 0.25 MG: 4 INJECTION, SOLUTION INTRA-ARTICULAR; INTRALESIONAL; INTRAMUSCULAR; INTRAVENOUS; SOFT TISSUE at 09:04

## 2025-04-29 RX ADMIN — DIPHENHYDRAMINE HYDROCHLORIDE 25 MG: 50 INJECTION INTRAMUSCULAR; INTRAVENOUS at 09:04

## 2025-04-29 RX ADMIN — FLUOROURACIL 4000 MG: 50 INJECTION, SOLUTION INTRAVENOUS at 11:04

## 2025-04-29 RX ADMIN — FLUOROURACIL 670 MG: 50 INJECTION, SOLUTION INTRAVENOUS at 11:04
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
## 2025-05-01 ENCOUNTER — INFUSION (OUTPATIENT)
Dept: INFUSION THERAPY | Facility: HOSPITAL | Age: 65
End: 2025-05-01
Attending: INTERNAL MEDICINE
Payer: COMMERCIAL

## 2025-05-01 VITALS — BODY MASS INDEX: 15.25 KG/M2 | WEIGHT: 112.44 LBS

## 2025-05-01 DIAGNOSIS — E43 SEVERE MALNUTRITION: ICD-10-CM

## 2025-05-01 DIAGNOSIS — C18.9 MALIGNANT NEOPLASM OF COLON, UNSPECIFIED PART OF COLON: Primary | ICD-10-CM

## 2025-05-01 PROCEDURE — 25000003 PHARM REV CODE 250: Performed by: INTERNAL MEDICINE

## 2025-05-01 PROCEDURE — 96361 HYDRATE IV INFUSION ADD-ON: CPT

## 2025-05-01 PROCEDURE — 96360 HYDRATION IV INFUSION INIT: CPT

## 2025-05-01 RX ORDER — SODIUM CHLORIDE 0.9 % (FLUSH) 0.9 %
10 SYRINGE (ML) INJECTION
Status: DISCONTINUED | OUTPATIENT
Start: 2025-05-01 | End: 2025-05-01 | Stop reason: HOSPADM

## 2025-05-01 RX ORDER — SODIUM CHLORIDE 0.9 % (FLUSH) 0.9 %
10 SYRINGE (ML) INJECTION
OUTPATIENT
Start: 2025-05-01

## 2025-05-01 RX ORDER — HEPARIN 100 UNIT/ML
500 SYRINGE INTRAVENOUS
OUTPATIENT
Start: 2025-05-01

## 2025-05-01 RX ORDER — PROCHLORPERAZINE EDISYLATE 5 MG/ML
5 INJECTION INTRAMUSCULAR; INTRAVENOUS ONCE AS NEEDED
Status: DISCONTINUED | OUTPATIENT
Start: 2025-05-01 | End: 2025-05-01 | Stop reason: HOSPADM

## 2025-05-01 RX ORDER — HEPARIN 100 UNIT/ML
500 SYRINGE INTRAVENOUS
Status: DISCONTINUED | OUTPATIENT
Start: 2025-05-01 | End: 2025-05-01 | Stop reason: HOSPADM

## 2025-05-01 RX ADMIN — SODIUM CHLORIDE 1000 ML: 9 INJECTION, SOLUTION INTRAVENOUS at 10:05

## 2025-05-06 ENCOUNTER — INFUSION (OUTPATIENT)
Dept: INFUSION THERAPY | Facility: HOSPITAL | Age: 65
End: 2025-05-06
Attending: INTERNAL MEDICINE
Payer: COMMERCIAL

## 2025-05-06 ENCOUNTER — CLINICAL SUPPORT (OUTPATIENT)
Dept: HEMATOLOGY/ONCOLOGY | Facility: CLINIC | Age: 65
End: 2025-05-06
Payer: COMMERCIAL

## 2025-05-06 VITALS
DIASTOLIC BLOOD PRESSURE: 68 MMHG | OXYGEN SATURATION: 100 % | BODY MASS INDEX: 15.33 KG/M2 | TEMPERATURE: 97 F | RESPIRATION RATE: 18 BRPM | HEIGHT: 72 IN | SYSTOLIC BLOOD PRESSURE: 92 MMHG | HEART RATE: 90 BPM | WEIGHT: 113.19 LBS

## 2025-05-06 DIAGNOSIS — C18.9 MALIGNANT NEOPLASM OF COLON, UNSPECIFIED PART OF COLON: Primary | ICD-10-CM

## 2025-05-06 DIAGNOSIS — E43 SEVERE MALNUTRITION: ICD-10-CM

## 2025-05-06 PROCEDURE — 96360 HYDRATION IV INFUSION INIT: CPT

## 2025-05-06 PROCEDURE — 25000003 PHARM REV CODE 250: Performed by: INTERNAL MEDICINE

## 2025-05-06 PROCEDURE — 63600175 PHARM REV CODE 636 W HCPCS: Performed by: INTERNAL MEDICINE

## 2025-05-06 PROCEDURE — 99999 PR PBB SHADOW E&M-EST. PATIENT-LVL I: CPT | Mod: PBBFAC,,,

## 2025-05-06 PROCEDURE — A4216 STERILE WATER/SALINE, 10 ML: HCPCS | Performed by: INTERNAL MEDICINE

## 2025-05-06 RX ORDER — HEPARIN 100 UNIT/ML
500 SYRINGE INTRAVENOUS
Status: DISCONTINUED | OUTPATIENT
Start: 2025-05-06 | End: 2025-05-06 | Stop reason: HOSPADM

## 2025-05-06 RX ORDER — HEPARIN 100 UNIT/ML
500 SYRINGE INTRAVENOUS
OUTPATIENT
Start: 2025-05-06

## 2025-05-06 RX ORDER — SODIUM CHLORIDE 0.9 % (FLUSH) 0.9 %
10 SYRINGE (ML) INJECTION
OUTPATIENT
Start: 2025-05-06

## 2025-05-06 RX ORDER — SODIUM CHLORIDE 0.9 % (FLUSH) 0.9 %
10 SYRINGE (ML) INJECTION
Status: DISCONTINUED | OUTPATIENT
Start: 2025-05-06 | End: 2025-05-06 | Stop reason: HOSPADM

## 2025-05-06 RX ADMIN — HEPARIN 500 UNITS: 100 SYRINGE at 03:05

## 2025-05-06 RX ADMIN — Medication 10 ML: at 03:05

## 2025-05-06 RX ADMIN — SODIUM CHLORIDE 1000 ML: 9 INJECTION, SOLUTION INTRAVENOUS at 02:05

## 2025-05-06 NOTE — PLAN OF CARE
1L NS given. Tolerated well. Next appts confirmed with pt. Stated he gets his appts off his phone. Dc'd home in stable condition.
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
## 2025-05-12 ENCOUNTER — OFFICE VISIT (OUTPATIENT)
Dept: HEMATOLOGY/ONCOLOGY | Facility: CLINIC | Age: 65
End: 2025-05-12
Payer: COMMERCIAL

## 2025-05-12 ENCOUNTER — LAB VISIT (OUTPATIENT)
Dept: LAB | Facility: HOSPITAL | Age: 65
End: 2025-05-12
Attending: INTERNAL MEDICINE
Payer: COMMERCIAL

## 2025-05-12 VITALS
SYSTOLIC BLOOD PRESSURE: 91 MMHG | BODY MASS INDEX: 15.15 KG/M2 | HEIGHT: 72 IN | WEIGHT: 111.81 LBS | HEART RATE: 84 BPM | DIASTOLIC BLOOD PRESSURE: 70 MMHG | OXYGEN SATURATION: 98 % | RESPIRATION RATE: 18 BRPM

## 2025-05-12 DIAGNOSIS — Z79.69 ON ANTINEOPLASTIC CHEMOTHERAPY: ICD-10-CM

## 2025-05-12 DIAGNOSIS — C18.9 MALIGNANT NEOPLASM OF COLON, UNSPECIFIED PART OF COLON: ICD-10-CM

## 2025-05-12 LAB
ALBUMIN SERPL-MCNC: 3.2 G/DL (ref 3.4–4.8)
ALBUMIN/GLOB SERPL: 0.8 RATIO (ref 1.1–2)
ALP SERPL-CCNC: 614 UNIT/L (ref 40–150)
ALT SERPL-CCNC: 29 UNIT/L (ref 0–55)
ANION GAP SERPL CALC-SCNC: 13 MEQ/L
AST SERPL-CCNC: 49 UNIT/L (ref 11–45)
BASOPHILS # BLD AUTO: 0.05 X10(3)/MCL
BASOPHILS NFR BLD AUTO: 0.8 %
BILIRUB SERPL-MCNC: 0.5 MG/DL
BUN SERPL-MCNC: 27.6 MG/DL (ref 8.4–25.7)
CALCIUM SERPL-MCNC: 9.8 MG/DL (ref 8.8–10)
CEA SERPL-MCNC: 563.35 NG/ML (ref 0–3)
CHLORIDE SERPL-SCNC: 106 MMOL/L (ref 98–107)
CO2 SERPL-SCNC: 22 MMOL/L (ref 23–31)
CREAT SERPL-MCNC: 0.96 MG/DL (ref 0.72–1.25)
CREAT/UREA NIT SERPL: 29
EOSINOPHIL # BLD AUTO: 0.03 X10(3)/MCL (ref 0–0.9)
EOSINOPHIL NFR BLD AUTO: 0.5 %
ERYTHROCYTE [DISTWIDTH] IN BLOOD BY AUTOMATED COUNT: 15.9 % (ref 11.5–17)
GFR SERPLBLD CREATININE-BSD FMLA CKD-EPI: >60 ML/MIN/1.73/M2
GLOBULIN SER-MCNC: 4.2 GM/DL (ref 2.4–3.5)
GLUCOSE SERPL-MCNC: 103 MG/DL (ref 82–115)
HCT VFR BLD AUTO: 37 % (ref 42–52)
HGB BLD-MCNC: 12.5 G/DL (ref 14–18)
IMM GRANULOCYTES # BLD AUTO: 0.01 X10(3)/MCL (ref 0–0.04)
IMM GRANULOCYTES NFR BLD AUTO: 0.2 %
LYMPHOCYTES # BLD AUTO: 2.39 X10(3)/MCL (ref 0.6–4.6)
LYMPHOCYTES NFR BLD AUTO: 38.9 %
MAGNESIUM SERPL-MCNC: 2.1 MG/DL (ref 1.6–2.6)
MCH RBC QN AUTO: 33.1 PG (ref 27–31)
MCHC RBC AUTO-ENTMCNC: 33.8 G/DL (ref 33–36)
MCV RBC AUTO: 97.9 FL (ref 80–94)
MONOCYTES # BLD AUTO: 0.78 X10(3)/MCL (ref 0.1–1.3)
MONOCYTES NFR BLD AUTO: 12.7 %
NEUTROPHILS # BLD AUTO: 2.88 X10(3)/MCL (ref 2.1–9.2)
NEUTROPHILS NFR BLD AUTO: 46.9 %
PLATELET # BLD AUTO: 269 X10(3)/MCL (ref 130–400)
PMV BLD AUTO: 8.7 FL (ref 7.4–10.4)
POTASSIUM SERPL-SCNC: 5.5 MMOL/L (ref 3.5–5.1)
PROT SERPL-MCNC: 7.4 GM/DL (ref 5.8–7.6)
RBC # BLD AUTO: 3.78 X10(6)/MCL (ref 4.7–6.1)
SODIUM SERPL-SCNC: 141 MMOL/L (ref 136–145)
WBC # BLD AUTO: 6.14 X10(3)/MCL (ref 4.5–11.5)

## 2025-05-12 PROCEDURE — 1159F MED LIST DOCD IN RCRD: CPT | Mod: CPTII,S$GLB,, | Performed by: INTERNAL MEDICINE

## 2025-05-12 PROCEDURE — 1101F PT FALLS ASSESS-DOCD LE1/YR: CPT | Mod: CPTII,S$GLB,, | Performed by: INTERNAL MEDICINE

## 2025-05-12 PROCEDURE — 83735 ASSAY OF MAGNESIUM: CPT

## 2025-05-12 PROCEDURE — G2211 COMPLEX E/M VISIT ADD ON: HCPCS | Mod: S$GLB,,, | Performed by: INTERNAL MEDICINE

## 2025-05-12 PROCEDURE — 3288F FALL RISK ASSESSMENT DOCD: CPT | Mod: CPTII,S$GLB,, | Performed by: INTERNAL MEDICINE

## 2025-05-12 PROCEDURE — 82378 CARCINOEMBRYONIC ANTIGEN: CPT

## 2025-05-12 PROCEDURE — 3074F SYST BP LT 130 MM HG: CPT | Mod: CPTII,S$GLB,, | Performed by: INTERNAL MEDICINE

## 2025-05-12 PROCEDURE — 1160F RVW MEDS BY RX/DR IN RCRD: CPT | Mod: CPTII,S$GLB,, | Performed by: INTERNAL MEDICINE

## 2025-05-12 PROCEDURE — 85025 COMPLETE CBC W/AUTO DIFF WBC: CPT

## 2025-05-12 PROCEDURE — 3008F BODY MASS INDEX DOCD: CPT | Mod: CPTII,S$GLB,, | Performed by: INTERNAL MEDICINE

## 2025-05-12 PROCEDURE — 99999 PR PBB SHADOW E&M-EST. PATIENT-LVL IV: CPT | Mod: PBBFAC,,, | Performed by: INTERNAL MEDICINE

## 2025-05-12 PROCEDURE — 36415 COLL VENOUS BLD VENIPUNCTURE: CPT

## 2025-05-12 PROCEDURE — 99215 OFFICE O/P EST HI 40 MIN: CPT | Mod: S$GLB,,, | Performed by: INTERNAL MEDICINE

## 2025-05-12 PROCEDURE — 3078F DIAST BP <80 MM HG: CPT | Mod: CPTII,S$GLB,, | Performed by: INTERNAL MEDICINE

## 2025-05-12 PROCEDURE — 80053 COMPREHEN METABOLIC PANEL: CPT

## 2025-05-12 RX ORDER — PROCHLORPERAZINE EDISYLATE 5 MG/ML
5 INJECTION INTRAMUSCULAR; INTRAVENOUS ONCE AS NEEDED
Status: CANCELLED | OUTPATIENT
Start: 2025-05-13

## 2025-05-12 RX ORDER — HEPARIN 100 UNIT/ML
500 SYRINGE INTRAVENOUS
Status: CANCELLED | OUTPATIENT
Start: 2025-05-13

## 2025-05-12 RX ORDER — SODIUM CHLORIDE 0.9 % (FLUSH) 0.9 %
10 SYRINGE (ML) INJECTION
Status: CANCELLED | OUTPATIENT
Start: 2025-05-13

## 2025-05-12 RX ORDER — FLUOROURACIL 50 MG/ML
400 INJECTION, SOLUTION INTRAVENOUS
Status: CANCELLED | OUTPATIENT
Start: 2025-05-13

## 2025-05-12 RX ORDER — HEPARIN 100 UNIT/ML
500 SYRINGE INTRAVENOUS
Status: CANCELLED | OUTPATIENT
Start: 2025-05-15

## 2025-05-12 RX ORDER — DIPHENHYDRAMINE HYDROCHLORIDE 50 MG/ML
50 INJECTION, SOLUTION INTRAMUSCULAR; INTRAVENOUS ONCE AS NEEDED
Status: CANCELLED | OUTPATIENT
Start: 2025-05-13

## 2025-05-12 RX ORDER — EPINEPHRINE 0.3 MG/.3ML
0.3 INJECTION SUBCUTANEOUS ONCE AS NEEDED
Status: CANCELLED | OUTPATIENT
Start: 2025-05-13

## 2025-05-12 RX ORDER — SODIUM CHLORIDE 0.9 % (FLUSH) 0.9 %
10 SYRINGE (ML) INJECTION
Status: CANCELLED | OUTPATIENT
Start: 2025-05-15

## 2025-05-12 RX ORDER — DIPHENHYDRAMINE HYDROCHLORIDE 50 MG/ML
25 INJECTION, SOLUTION INTRAMUSCULAR; INTRAVENOUS
Status: CANCELLED
Start: 2025-05-13

## 2025-05-12 RX ORDER — PROCHLORPERAZINE EDISYLATE 5 MG/ML
5 INJECTION INTRAMUSCULAR; INTRAVENOUS ONCE AS NEEDED
Status: CANCELLED | OUTPATIENT
Start: 2025-05-15

## 2025-05-13 ENCOUNTER — INFUSION (OUTPATIENT)
Dept: INFUSION THERAPY | Facility: HOSPITAL | Age: 65
End: 2025-05-13
Attending: INTERNAL MEDICINE
Payer: COMMERCIAL

## 2025-05-13 VITALS
OXYGEN SATURATION: 100 % | WEIGHT: 111.75 LBS | BODY MASS INDEX: 15.16 KG/M2 | DIASTOLIC BLOOD PRESSURE: 66 MMHG | HEART RATE: 67 BPM | SYSTOLIC BLOOD PRESSURE: 98 MMHG | TEMPERATURE: 97 F

## 2025-05-13 DIAGNOSIS — C18.9 MALIGNANT NEOPLASM OF COLON, UNSPECIFIED PART OF COLON: Primary | ICD-10-CM

## 2025-05-13 PROCEDURE — 96411 CHEMO IV PUSH ADDL DRUG: CPT

## 2025-05-13 PROCEDURE — 25000003 PHARM REV CODE 250: Performed by: INTERNAL MEDICINE

## 2025-05-13 PROCEDURE — 96416 CHEMO PROLONG INFUSE W/PUMP: CPT

## 2025-05-13 PROCEDURE — 96368 THER/DIAG CONCURRENT INF: CPT

## 2025-05-13 PROCEDURE — 96413 CHEMO IV INFUSION 1 HR: CPT

## 2025-05-13 PROCEDURE — 96415 CHEMO IV INFUSION ADDL HR: CPT

## 2025-05-13 PROCEDURE — 96375 TX/PRO/DX INJ NEW DRUG ADDON: CPT

## 2025-05-13 PROCEDURE — 96367 TX/PROPH/DG ADDL SEQ IV INF: CPT

## 2025-05-13 PROCEDURE — 63600175 PHARM REV CODE 636 W HCPCS: Performed by: INTERNAL MEDICINE

## 2025-05-13 RX ORDER — EPINEPHRINE 0.3 MG/.3ML
0.3 INJECTION SUBCUTANEOUS ONCE AS NEEDED
Status: DISCONTINUED | OUTPATIENT
Start: 2025-05-13 | End: 2025-05-13 | Stop reason: HOSPADM

## 2025-05-13 RX ORDER — PROCHLORPERAZINE EDISYLATE 5 MG/ML
5 INJECTION INTRAMUSCULAR; INTRAVENOUS ONCE AS NEEDED
Status: DISCONTINUED | OUTPATIENT
Start: 2025-05-13 | End: 2025-05-13 | Stop reason: HOSPADM

## 2025-05-13 RX ORDER — SODIUM CHLORIDE 0.9 % (FLUSH) 0.9 %
10 SYRINGE (ML) INJECTION
Status: DISCONTINUED | OUTPATIENT
Start: 2025-05-13 | End: 2025-05-13 | Stop reason: HOSPADM

## 2025-05-13 RX ORDER — FLUOROURACIL 50 MG/ML
400 INJECTION, SOLUTION INTRAVENOUS
Status: COMPLETED | OUTPATIENT
Start: 2025-05-13 | End: 2025-05-13

## 2025-05-13 RX ORDER — DIPHENHYDRAMINE HYDROCHLORIDE 50 MG/ML
25 INJECTION, SOLUTION INTRAMUSCULAR; INTRAVENOUS
Status: COMPLETED | OUTPATIENT
Start: 2025-05-13 | End: 2025-05-13

## 2025-05-13 RX ORDER — DIPHENHYDRAMINE HYDROCHLORIDE 50 MG/ML
50 INJECTION, SOLUTION INTRAMUSCULAR; INTRAVENOUS ONCE AS NEEDED
Status: DISCONTINUED | OUTPATIENT
Start: 2025-05-13 | End: 2025-05-13 | Stop reason: HOSPADM

## 2025-05-13 RX ORDER — HEPARIN 100 UNIT/ML
500 SYRINGE INTRAVENOUS
Status: DISCONTINUED | OUTPATIENT
Start: 2025-05-13 | End: 2025-05-13 | Stop reason: HOSPADM

## 2025-05-13 RX ADMIN — DEXAMETHASONE SODIUM PHOSPHATE 0.25 MG: 4 INJECTION, SOLUTION INTRA-ARTICULAR; INTRALESIONAL; INTRAMUSCULAR; INTRAVENOUS; SOFT TISSUE at 09:05

## 2025-05-13 RX ADMIN — LEUCOVORIN CALCIUM 650 MG: 350 INJECTION, POWDER, LYOPHILIZED, FOR SOLUTION INTRAMUSCULAR; INTRAVENOUS at 09:05

## 2025-05-13 RX ADMIN — FLUOROURACIL 4000 MG: 50 INJECTION, SOLUTION INTRAVENOUS at 12:05

## 2025-05-13 RX ADMIN — DIPHENHYDRAMINE HYDROCHLORIDE 25 MG: 50 INJECTION INTRAMUSCULAR; INTRAVENOUS at 09:05

## 2025-05-13 RX ADMIN — FLUOROURACIL 670 MG: 50 INJECTION, SOLUTION INTRAVENOUS at 11:05

## 2025-05-13 RX ADMIN — OXALIPLATIN 142 MG: 5 INJECTION, SOLUTION INTRAVENOUS at 09:05

## 2025-05-15 ENCOUNTER — CLINICAL SUPPORT (OUTPATIENT)
Dept: HEMATOLOGY/ONCOLOGY | Facility: CLINIC | Age: 65
End: 2025-05-15
Payer: COMMERCIAL

## 2025-05-15 ENCOUNTER — INFUSION (OUTPATIENT)
Dept: INFUSION THERAPY | Facility: HOSPITAL | Age: 65
End: 2025-05-15
Attending: INTERNAL MEDICINE
Payer: COMMERCIAL

## 2025-05-15 VITALS
WEIGHT: 111.75 LBS | DIASTOLIC BLOOD PRESSURE: 63 MMHG | HEART RATE: 68 BPM | TEMPERATURE: 98 F | BODY MASS INDEX: 15.16 KG/M2 | OXYGEN SATURATION: 99 % | SYSTOLIC BLOOD PRESSURE: 101 MMHG

## 2025-05-15 DIAGNOSIS — E43 SEVERE MALNUTRITION: ICD-10-CM

## 2025-05-15 DIAGNOSIS — C18.9 MALIGNANT NEOPLASM OF COLON, UNSPECIFIED PART OF COLON: Primary | ICD-10-CM

## 2025-05-15 PROCEDURE — 63600175 PHARM REV CODE 636 W HCPCS: Performed by: INTERNAL MEDICINE

## 2025-05-15 PROCEDURE — 99999 PR PBB SHADOW E&M-EST. PATIENT-LVL I: CPT | Mod: PBBFAC,,,

## 2025-05-15 PROCEDURE — 96360 HYDRATION IV INFUSION INIT: CPT

## 2025-05-15 PROCEDURE — 25000003 PHARM REV CODE 250: Performed by: INTERNAL MEDICINE

## 2025-05-15 RX ORDER — SODIUM CHLORIDE 0.9 % (FLUSH) 0.9 %
10 SYRINGE (ML) INJECTION
OUTPATIENT
Start: 2025-05-15

## 2025-05-15 RX ORDER — SODIUM CHLORIDE 0.9 % (FLUSH) 0.9 %
10 SYRINGE (ML) INJECTION
Status: DISCONTINUED | OUTPATIENT
Start: 2025-05-15 | End: 2025-05-15 | Stop reason: HOSPADM

## 2025-05-15 RX ORDER — HEPARIN 100 UNIT/ML
500 SYRINGE INTRAVENOUS
Status: DISCONTINUED | OUTPATIENT
Start: 2025-05-15 | End: 2025-05-15 | Stop reason: HOSPADM

## 2025-05-15 RX ORDER — HEPARIN 100 UNIT/ML
500 SYRINGE INTRAVENOUS
OUTPATIENT
Start: 2025-05-15

## 2025-05-15 RX ORDER — PROCHLORPERAZINE EDISYLATE 5 MG/ML
5 INJECTION INTRAMUSCULAR; INTRAVENOUS ONCE AS NEEDED
Status: DISCONTINUED | OUTPATIENT
Start: 2025-05-15 | End: 2025-05-15 | Stop reason: HOSPADM

## 2025-05-15 RX ADMIN — SODIUM CHLORIDE 1000 ML: 9 INJECTION, SOLUTION INTRAVENOUS at 10:05

## 2025-05-15 RX ADMIN — HEPARIN 500 UNITS: 100 SYRINGE at 11:05
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
## 2025-05-16 ENCOUNTER — HOSPITAL ENCOUNTER (OUTPATIENT)
Dept: INTERVENTIONAL RADIOLOGY/VASCULAR | Facility: HOSPITAL | Age: 65
Discharge: HOME | End: 2025-05-16
Attending: NURSE PRACTITIONER
Payer: COMMERCIAL

## 2025-05-16 VITALS
WEIGHT: 115.5 LBS | SYSTOLIC BLOOD PRESSURE: 97 MMHG | RESPIRATION RATE: 20 BRPM | TEMPERATURE: 98 F | OXYGEN SATURATION: 100 % | DIASTOLIC BLOOD PRESSURE: 65 MMHG | HEART RATE: 63 BPM | BODY MASS INDEX: 16.17 KG/M2 | HEIGHT: 71 IN

## 2025-05-16 DIAGNOSIS — K76.9 LIVER LESION: ICD-10-CM

## 2025-05-16 DIAGNOSIS — C18.9 MALIGNANT NEOPLASM OF COLON, UNSPECIFIED PART OF COLON: ICD-10-CM

## 2025-05-16 DIAGNOSIS — C78.7 METASTASIS TO LIVER: Primary | Chronic | ICD-10-CM

## 2025-05-16 LAB
ALBUMIN SERPL-MCNC: 2.9 G/DL (ref 3.4–4.8)
ALBUMIN/GLOB SERPL: 0.7 RATIO (ref 1.1–2)
ALP SERPL-CCNC: 449 UNIT/L (ref 40–150)
ALT SERPL-CCNC: 23 UNIT/L (ref 0–55)
ANION GAP SERPL CALC-SCNC: 9 MEQ/L
AST SERPL-CCNC: 48 UNIT/L (ref 11–45)
BASOPHILS # BLD AUTO: 0.03 X10(3)/MCL
BASOPHILS NFR BLD AUTO: 0.6 %
BILIRUB SERPL-MCNC: 0.6 MG/DL
BUN SERPL-MCNC: 23.2 MG/DL (ref 8.4–25.7)
CALCIUM SERPL-MCNC: 9.4 MG/DL (ref 8.8–10)
CHLORIDE SERPL-SCNC: 108 MMOL/L (ref 98–107)
CO2 SERPL-SCNC: 23 MMOL/L (ref 23–31)
CREAT SERPL-MCNC: 0.76 MG/DL (ref 0.72–1.25)
CREAT/UREA NIT SERPL: 31
EOSINOPHIL # BLD AUTO: 0.01 X10(3)/MCL (ref 0–0.9)
EOSINOPHIL NFR BLD AUTO: 0.2 %
ERYTHROCYTE [DISTWIDTH] IN BLOOD BY AUTOMATED COUNT: 17.2 % (ref 11.5–17)
GFR SERPLBLD CREATININE-BSD FMLA CKD-EPI: >60 ML/MIN/1.73/M2
GLOBULIN SER-MCNC: 3.9 GM/DL (ref 2.4–3.5)
GLUCOSE SERPL-MCNC: 83 MG/DL (ref 82–115)
HCT VFR BLD AUTO: 36 % (ref 42–52)
HGB BLD-MCNC: 11.6 G/DL (ref 14–18)
IMM GRANULOCYTES # BLD AUTO: 0.01 X10(3)/MCL (ref 0–0.04)
IMM GRANULOCYTES NFR BLD AUTO: 0.2 %
INR PPP: 1.1
LYMPHOCYTES # BLD AUTO: 1.44 X10(3)/MCL (ref 0.6–4.6)
LYMPHOCYTES NFR BLD AUTO: 30.8 %
MCH RBC QN AUTO: 31.6 PG (ref 27–31)
MCHC RBC AUTO-ENTMCNC: 32.2 G/DL (ref 33–36)
MCV RBC AUTO: 98.1 FL (ref 80–94)
MONOCYTES # BLD AUTO: 0.15 X10(3)/MCL (ref 0.1–1.3)
MONOCYTES NFR BLD AUTO: 3.2 %
NEUTROPHILS # BLD AUTO: 3.03 X10(3)/MCL (ref 2.1–9.2)
NEUTROPHILS NFR BLD AUTO: 65 %
NRBC BLD AUTO-RTO: 0 %
PLATELET # BLD AUTO: 249 X10(3)/MCL (ref 130–400)
PMV BLD AUTO: 8.9 FL (ref 7.4–10.4)
POTASSIUM SERPL-SCNC: 3.9 MMOL/L (ref 3.5–5.1)
PROT SERPL-MCNC: 6.8 GM/DL (ref 5.8–7.6)
PROTHROMBIN TIME: 14.1 SECONDS (ref 12.5–14.5)
RBC # BLD AUTO: 3.67 X10(6)/MCL (ref 4.7–6.1)
SODIUM SERPL-SCNC: 140 MMOL/L (ref 136–145)
WBC # BLD AUTO: 4.67 X10(3)/MCL (ref 4.5–11.5)

## 2025-05-16 PROCEDURE — 80053 COMPREHEN METABOLIC PANEL: CPT | Performed by: RADIOLOGY

## 2025-05-16 PROCEDURE — 63600175 PHARM REV CODE 636 W HCPCS: Performed by: RADIOLOGY

## 2025-05-16 PROCEDURE — 36415 COLL VENOUS BLD VENIPUNCTURE: CPT | Performed by: RADIOLOGY

## 2025-05-16 PROCEDURE — 99153 MOD SED SAME PHYS/QHP EA: CPT

## 2025-05-16 PROCEDURE — 85610 PROTHROMBIN TIME: CPT | Performed by: RADIOLOGY

## 2025-05-16 PROCEDURE — 85025 COMPLETE CBC W/AUTO DIFF WBC: CPT | Performed by: RADIOLOGY

## 2025-05-16 PROCEDURE — 99152 MOD SED SAME PHYS/QHP 5/>YRS: CPT

## 2025-05-16 RX ORDER — LIDOCAINE HYDROCHLORIDE 20 MG/ML
INJECTION, SOLUTION INFILTRATION; PERINEURAL
Status: COMPLETED | OUTPATIENT
Start: 2025-05-16 | End: 2025-05-16

## 2025-05-16 RX ORDER — MIDAZOLAM HYDROCHLORIDE 2 MG/2ML
INJECTION, SOLUTION INTRAMUSCULAR; INTRAVENOUS
Status: COMPLETED | OUTPATIENT
Start: 2025-05-16 | End: 2025-05-16

## 2025-05-16 RX ORDER — FENTANYL CITRATE 50 UG/ML
INJECTION, SOLUTION INTRAMUSCULAR; INTRAVENOUS
Status: DISPENSED
Start: 2025-05-16 | End: 2025-05-16

## 2025-05-16 RX ORDER — FENTANYL CITRATE 50 UG/ML
INJECTION, SOLUTION INTRAMUSCULAR; INTRAVENOUS
Status: COMPLETED | OUTPATIENT
Start: 2025-05-16 | End: 2025-05-16

## 2025-05-16 RX ORDER — MIDAZOLAM HYDROCHLORIDE 2 MG/2ML
INJECTION, SOLUTION INTRAMUSCULAR; INTRAVENOUS
Status: DISPENSED
Start: 2025-05-16 | End: 2025-05-16

## 2025-05-16 RX ADMIN — LIDOCAINE HYDROCHLORIDE 4 ML: 20 INJECTION, SOLUTION INFILTRATION; PERINEURAL at 09:05

## 2025-05-16 RX ADMIN — FENTANYL CITRATE 25 MCG: 50 INJECTION INTRAMUSCULAR; INTRAVENOUS at 09:05

## 2025-05-16 RX ADMIN — MIDAZOLAM HYDROCHLORIDE 0.5 MG: 1 INJECTION, SOLUTION INTRAMUSCULAR; INTRAVENOUS at 09:05

## 2025-05-16 NOTE — INTERVAL H&P NOTE
The patient has been examined and the H&P has been reviewed:    I concur with the findings and no changes have occurred since H&P was written. Lincoln Vizcarra is a 64 y.o. male with Colon Cancer with Liver Mass who presents for Liver Mass Biopsy.           There are no hospital problems to display for this patient.

## 2025-05-16 NOTE — DISCHARGE SUMMARY
Radiology Post-Procedure Note    Pre Op Diagnosis: Metastasis to liver    Post Op Diagnosis: Same    Secondary Diagnoses:   Problem List Items Addressed This Visit       Colon cancer    Relevant Orders    IR Biopsy Liver (XPD)    * (Principal) Metastasis to liver - Primary (Chronic)     Other Visit Diagnoses         Liver lesion        Relevant Orders    IR Biopsy Liver (XPD)             Procedure: CT Guided Liver Mass Biopsy    Procedure performed by: Rubén Fletcher MD    Assistant: None    Written Informed Consent Obtained: Yes    Specimen Removed: 18g core x 4    Estimated Blood Loss: <10 cc    Condition: Stable    Outcome: The patient tolerated the procedure well and was without complications.    For further details please see the imaging report associated.    Disposition: Home or Self Care    Follow Up: With primary care provider    Discharge Instructions:  No discharge procedures on file.       Time Spent On Discharge: 2 minutes

## 2025-05-20 LAB — PSYCHE PATHOLOGY RESULT: NORMAL

## 2025-05-23 DIAGNOSIS — C18.9 MALIGNANT NEOPLASM OF COLON, UNSPECIFIED PART OF COLON: Primary | ICD-10-CM

## 2025-05-23 DIAGNOSIS — C78.7 SECONDARY MALIGNANT NEOPLASM OF LIVER: ICD-10-CM
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
## 2025-05-26 ENCOUNTER — LAB VISIT (OUTPATIENT)
Dept: LAB | Facility: HOSPITAL | Age: 65
End: 2025-05-26
Attending: INTERNAL MEDICINE
Payer: COMMERCIAL

## 2025-05-26 ENCOUNTER — OFFICE VISIT (OUTPATIENT)
Dept: HEMATOLOGY/ONCOLOGY | Facility: CLINIC | Age: 65
End: 2025-05-26
Payer: COMMERCIAL

## 2025-05-26 VITALS
BODY MASS INDEX: 15.31 KG/M2 | HEART RATE: 77 BPM | WEIGHT: 113 LBS | DIASTOLIC BLOOD PRESSURE: 69 MMHG | OXYGEN SATURATION: 100 % | HEIGHT: 72 IN | RESPIRATION RATE: 18 BRPM | SYSTOLIC BLOOD PRESSURE: 93 MMHG

## 2025-05-26 DIAGNOSIS — C78.7 SECONDARY MALIGNANT NEOPLASM OF LIVER: ICD-10-CM

## 2025-05-26 DIAGNOSIS — C18.9 MALIGNANT NEOPLASM OF COLON, UNSPECIFIED PART OF COLON: ICD-10-CM

## 2025-05-26 DIAGNOSIS — Z79.69 ON ANTINEOPLASTIC CHEMOTHERAPY: ICD-10-CM

## 2025-05-26 DIAGNOSIS — M25.552 PAIN OF LEFT HIP: Primary | ICD-10-CM

## 2025-05-26 LAB
ALBUMIN SERPL-MCNC: 3.4 G/DL (ref 3.4–4.8)
ALBUMIN/GLOB SERPL: 0.9 RATIO (ref 1.1–2)
ALP SERPL-CCNC: 341 UNIT/L (ref 40–150)
ALT SERPL-CCNC: 14 UNIT/L (ref 0–55)
ANION GAP SERPL CALC-SCNC: 12 MEQ/L
AST SERPL-CCNC: 35 UNIT/L (ref 11–45)
BASOPHILS # BLD AUTO: 0.04 X10(3)/MCL
BASOPHILS NFR BLD AUTO: 0.8 %
BILIRUB SERPL-MCNC: 0.6 MG/DL
BUN SERPL-MCNC: 36.4 MG/DL (ref 8.4–25.7)
CALCIUM SERPL-MCNC: 9.7 MG/DL (ref 8.8–10)
CEA SERPL-MCNC: 422.48 NG/ML (ref 0–3)
CHLORIDE SERPL-SCNC: 104 MMOL/L (ref 98–107)
CO2 SERPL-SCNC: 23 MMOL/L (ref 23–31)
CREAT SERPL-MCNC: 0.87 MG/DL (ref 0.72–1.25)
CREAT/UREA NIT SERPL: 42
EOSINOPHIL # BLD AUTO: 0.02 X10(3)/MCL (ref 0–0.9)
EOSINOPHIL NFR BLD AUTO: 0.4 %
ERYTHROCYTE [DISTWIDTH] IN BLOOD BY AUTOMATED COUNT: 17.7 % (ref 11.5–17)
GFR SERPLBLD CREATININE-BSD FMLA CKD-EPI: >60 ML/MIN/1.73/M2
GLOBULIN SER-MCNC: 4 GM/DL (ref 2.4–3.5)
GLUCOSE SERPL-MCNC: 101 MG/DL (ref 82–115)
HCT VFR BLD AUTO: 37.8 % (ref 42–52)
HGB BLD-MCNC: 12.6 G/DL (ref 14–18)
IMM GRANULOCYTES # BLD AUTO: 0.01 X10(3)/MCL (ref 0–0.04)
IMM GRANULOCYTES NFR BLD AUTO: 0.2 %
LYMPHOCYTES # BLD AUTO: 2.21 X10(3)/MCL (ref 0.6–4.6)
LYMPHOCYTES NFR BLD AUTO: 42.7 %
MAGNESIUM SERPL-MCNC: 1.9 MG/DL (ref 1.6–2.6)
MCH RBC QN AUTO: 32.6 PG (ref 27–31)
MCHC RBC AUTO-ENTMCNC: 33.3 G/DL (ref 33–36)
MCV RBC AUTO: 97.9 FL (ref 80–94)
MONOCYTES # BLD AUTO: 0.47 X10(3)/MCL (ref 0.1–1.3)
MONOCYTES NFR BLD AUTO: 9.1 %
NEUTROPHILS # BLD AUTO: 2.42 X10(3)/MCL (ref 2.1–9.2)
NEUTROPHILS NFR BLD AUTO: 46.8 %
PLATELET # BLD AUTO: 198 X10(3)/MCL (ref 130–400)
PMV BLD AUTO: 8.8 FL (ref 7.4–10.4)
POTASSIUM SERPL-SCNC: 4 MMOL/L (ref 3.5–5.1)
PROT SERPL-MCNC: 7.4 GM/DL (ref 5.8–7.6)
RBC # BLD AUTO: 3.86 X10(6)/MCL (ref 4.7–6.1)
SODIUM SERPL-SCNC: 139 MMOL/L (ref 136–145)
WBC # BLD AUTO: 5.17 X10(3)/MCL (ref 4.5–11.5)

## 2025-05-26 PROCEDURE — 99999 PR PBB SHADOW E&M-EST. PATIENT-LVL IV: CPT | Mod: PBBFAC,,, | Performed by: INTERNAL MEDICINE

## 2025-05-26 PROCEDURE — 85025 COMPLETE CBC W/AUTO DIFF WBC: CPT

## 2025-05-26 PROCEDURE — 3078F DIAST BP <80 MM HG: CPT | Mod: CPTII,S$GLB,, | Performed by: INTERNAL MEDICINE

## 2025-05-26 PROCEDURE — 82378 CARCINOEMBRYONIC ANTIGEN: CPT

## 2025-05-26 PROCEDURE — G2211 COMPLEX E/M VISIT ADD ON: HCPCS | Mod: S$GLB,,, | Performed by: INTERNAL MEDICINE

## 2025-05-26 PROCEDURE — 3008F BODY MASS INDEX DOCD: CPT | Mod: CPTII,S$GLB,, | Performed by: INTERNAL MEDICINE

## 2025-05-26 PROCEDURE — 83735 ASSAY OF MAGNESIUM: CPT

## 2025-05-26 PROCEDURE — 80053 COMPREHEN METABOLIC PANEL: CPT

## 2025-05-26 PROCEDURE — 1159F MED LIST DOCD IN RCRD: CPT | Mod: CPTII,S$GLB,, | Performed by: INTERNAL MEDICINE

## 2025-05-26 PROCEDURE — 3074F SYST BP LT 130 MM HG: CPT | Mod: CPTII,S$GLB,, | Performed by: INTERNAL MEDICINE

## 2025-05-26 PROCEDURE — 36415 COLL VENOUS BLD VENIPUNCTURE: CPT

## 2025-05-26 PROCEDURE — 1101F PT FALLS ASSESS-DOCD LE1/YR: CPT | Mod: CPTII,S$GLB,, | Performed by: INTERNAL MEDICINE

## 2025-05-26 PROCEDURE — 3288F FALL RISK ASSESSMENT DOCD: CPT | Mod: CPTII,S$GLB,, | Performed by: INTERNAL MEDICINE

## 2025-05-26 PROCEDURE — 99215 OFFICE O/P EST HI 40 MIN: CPT | Mod: S$GLB,,, | Performed by: INTERNAL MEDICINE

## 2025-05-26 PROCEDURE — 1160F RVW MEDS BY RX/DR IN RCRD: CPT | Mod: CPTII,S$GLB,, | Performed by: INTERNAL MEDICINE

## 2025-05-26 RX ORDER — PROCHLORPERAZINE EDISYLATE 5 MG/ML
5 INJECTION INTRAMUSCULAR; INTRAVENOUS ONCE AS NEEDED
Status: CANCELLED | OUTPATIENT
Start: 2025-05-29

## 2025-05-26 RX ORDER — SODIUM CHLORIDE 0.9 % (FLUSH) 0.9 %
10 SYRINGE (ML) INJECTION
Status: CANCELLED | OUTPATIENT
Start: 2025-05-27

## 2025-05-26 RX ORDER — FLUOROURACIL 50 MG/ML
400 INJECTION, SOLUTION INTRAVENOUS
Status: CANCELLED | OUTPATIENT
Start: 2025-05-27

## 2025-05-26 RX ORDER — HEPARIN 100 UNIT/ML
500 SYRINGE INTRAVENOUS
Status: CANCELLED | OUTPATIENT
Start: 2025-05-27

## 2025-05-26 RX ORDER — DIPHENHYDRAMINE HYDROCHLORIDE 50 MG/ML
25 INJECTION, SOLUTION INTRAMUSCULAR; INTRAVENOUS
Status: CANCELLED
Start: 2025-05-27

## 2025-05-26 RX ORDER — PROCHLORPERAZINE EDISYLATE 5 MG/ML
5 INJECTION INTRAMUSCULAR; INTRAVENOUS ONCE AS NEEDED
Status: CANCELLED | OUTPATIENT
Start: 2025-05-27

## 2025-05-26 RX ORDER — EPINEPHRINE 0.3 MG/.3ML
0.3 INJECTION SUBCUTANEOUS ONCE AS NEEDED
Status: CANCELLED | OUTPATIENT
Start: 2025-05-27

## 2025-05-26 RX ORDER — SODIUM CHLORIDE 0.9 % (FLUSH) 0.9 %
10 SYRINGE (ML) INJECTION
Status: CANCELLED | OUTPATIENT
Start: 2025-05-29

## 2025-05-26 RX ORDER — DIPHENHYDRAMINE HYDROCHLORIDE 50 MG/ML
50 INJECTION, SOLUTION INTRAMUSCULAR; INTRAVENOUS ONCE AS NEEDED
Status: CANCELLED | OUTPATIENT
Start: 2025-05-27

## 2025-05-26 RX ORDER — HEPARIN 100 UNIT/ML
500 SYRINGE INTRAVENOUS
Status: CANCELLED | OUTPATIENT
Start: 2025-05-29

## 2025-05-27 ENCOUNTER — LAB VISIT (OUTPATIENT)
Dept: LAB | Facility: HOSPITAL | Age: 65
End: 2025-05-27
Attending: INTERNAL MEDICINE
Payer: COMMERCIAL

## 2025-05-27 ENCOUNTER — CLINICAL SUPPORT (OUTPATIENT)
Dept: HEMATOLOGY/ONCOLOGY | Facility: CLINIC | Age: 65
End: 2025-05-27
Payer: COMMERCIAL

## 2025-05-27 ENCOUNTER — INFUSION (OUTPATIENT)
Dept: INFUSION THERAPY | Facility: HOSPITAL | Age: 65
End: 2025-05-27
Attending: INTERNAL MEDICINE
Payer: COMMERCIAL

## 2025-05-27 VITALS
OXYGEN SATURATION: 100 % | SYSTOLIC BLOOD PRESSURE: 94 MMHG | DIASTOLIC BLOOD PRESSURE: 70 MMHG | WEIGHT: 113 LBS | TEMPERATURE: 98 F | RESPIRATION RATE: 18 BRPM | HEART RATE: 87 BPM | BODY MASS INDEX: 15.31 KG/M2 | HEIGHT: 72 IN

## 2025-05-27 DIAGNOSIS — C18.9 MALIGNANT NEOPLASM OF COLON, UNSPECIFIED PART OF COLON: Primary | ICD-10-CM

## 2025-05-27 DIAGNOSIS — C78.7 SECONDARY MALIGNANT NEOPLASM OF LIVER: ICD-10-CM

## 2025-05-27 DIAGNOSIS — C18.9 MALIGNANT NEOPLASM OF COLON, UNSPECIFIED PART OF COLON: ICD-10-CM

## 2025-05-27 DIAGNOSIS — M25.552 PAIN OF LEFT HIP: ICD-10-CM

## 2025-05-27 LAB — PROT UR QL STRIP: ABNORMAL

## 2025-05-27 PROCEDURE — 96375 TX/PRO/DX INJ NEW DRUG ADDON: CPT

## 2025-05-27 PROCEDURE — 99999 PR PBB SHADOW E&M-EST. PATIENT-LVL I: CPT | Mod: PBBFAC,,,

## 2025-05-27 PROCEDURE — 96415 CHEMO IV INFUSION ADDL HR: CPT

## 2025-05-27 PROCEDURE — 63600175 PHARM REV CODE 636 W HCPCS: Performed by: INTERNAL MEDICINE

## 2025-05-27 PROCEDURE — 25000003 PHARM REV CODE 250: Performed by: INTERNAL MEDICINE

## 2025-05-27 PROCEDURE — 96413 CHEMO IV INFUSION 1 HR: CPT

## 2025-05-27 PROCEDURE — 96367 TX/PROPH/DG ADDL SEQ IV INF: CPT

## 2025-05-27 PROCEDURE — 96416 CHEMO PROLONG INFUSE W/PUMP: CPT

## 2025-05-27 PROCEDURE — 96417 CHEMO IV INFUS EACH ADDL SEQ: CPT

## 2025-05-27 PROCEDURE — 96368 THER/DIAG CONCURRENT INF: CPT

## 2025-05-27 PROCEDURE — 96411 CHEMO IV PUSH ADDL DRUG: CPT

## 2025-05-27 PROCEDURE — 81005 URINALYSIS: CPT

## 2025-05-27 RX ORDER — PROCHLORPERAZINE EDISYLATE 5 MG/ML
5 INJECTION INTRAMUSCULAR; INTRAVENOUS ONCE AS NEEDED
Status: DISCONTINUED | OUTPATIENT
Start: 2025-05-27 | End: 2025-05-27 | Stop reason: HOSPADM

## 2025-05-27 RX ORDER — DIPHENHYDRAMINE HYDROCHLORIDE 50 MG/ML
25 INJECTION, SOLUTION INTRAMUSCULAR; INTRAVENOUS
Status: COMPLETED | OUTPATIENT
Start: 2025-05-27 | End: 2025-05-27

## 2025-05-27 RX ORDER — EPINEPHRINE 0.3 MG/.3ML
0.3 INJECTION SUBCUTANEOUS ONCE AS NEEDED
Status: DISCONTINUED | OUTPATIENT
Start: 2025-05-27 | End: 2025-05-27 | Stop reason: HOSPADM

## 2025-05-27 RX ORDER — HEPARIN 100 UNIT/ML
500 SYRINGE INTRAVENOUS
Status: DISCONTINUED | OUTPATIENT
Start: 2025-05-27 | End: 2025-05-27 | Stop reason: HOSPADM

## 2025-05-27 RX ORDER — FLUOROURACIL 50 MG/ML
400 INJECTION, SOLUTION INTRAVENOUS
Status: COMPLETED | OUTPATIENT
Start: 2025-05-27 | End: 2025-05-27

## 2025-05-27 RX ORDER — DIPHENHYDRAMINE HYDROCHLORIDE 50 MG/ML
50 INJECTION, SOLUTION INTRAMUSCULAR; INTRAVENOUS ONCE AS NEEDED
Status: DISCONTINUED | OUTPATIENT
Start: 2025-05-27 | End: 2025-05-27 | Stop reason: HOSPADM

## 2025-05-27 RX ORDER — SODIUM CHLORIDE 0.9 % (FLUSH) 0.9 %
10 SYRINGE (ML) INJECTION
Status: DISCONTINUED | OUTPATIENT
Start: 2025-05-27 | End: 2025-05-27 | Stop reason: HOSPADM

## 2025-05-27 RX ADMIN — FLUOROURACIL 670 MG: 50 INJECTION, SOLUTION INTRAVENOUS at 02:05

## 2025-05-27 RX ADMIN — FLUOROURACIL 4000 MG: 50 INJECTION, SOLUTION INTRAVENOUS at 02:05

## 2025-05-27 RX ADMIN — DEXAMETHASONE SODIUM PHOSPHATE 0.25 MG: 4 INJECTION, SOLUTION INTRA-ARTICULAR; INTRALESIONAL; INTRAMUSCULAR; INTRAVENOUS; SOFT TISSUE at 12:05

## 2025-05-27 RX ADMIN — OXALIPLATIN 142 MG: 5 INJECTION, SOLUTION INTRAVENOUS at 12:05

## 2025-05-27 RX ADMIN — SODIUM CHLORIDE 275 MG: 9 INJECTION, SOLUTION INTRAVENOUS at 10:05

## 2025-05-27 RX ADMIN — LEUCOVORIN CALCIUM 650 MG: 350 INJECTION, POWDER, LYOPHILIZED, FOR SOLUTION INTRAMUSCULAR; INTRAVENOUS at 12:05

## 2025-05-27 RX ADMIN — DIPHENHYDRAMINE HYDROCHLORIDE 25 MG: 50 INJECTION INTRAMUSCULAR; INTRAVENOUS at 12:05

## 2025-05-27 NOTE — PLAN OF CARE
Patient received C4D1, tolerated well. Left facility with CADD pump. Will return on Thursday around 1245. Voiced understanding.
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
## 2025-05-29 ENCOUNTER — INFUSION (OUTPATIENT)
Dept: INFUSION THERAPY | Facility: HOSPITAL | Age: 65
End: 2025-05-29
Attending: INTERNAL MEDICINE
Payer: COMMERCIAL

## 2025-05-29 VITALS
RESPIRATION RATE: 16 BRPM | TEMPERATURE: 98 F | SYSTOLIC BLOOD PRESSURE: 103 MMHG | HEART RATE: 90 BPM | DIASTOLIC BLOOD PRESSURE: 76 MMHG | OXYGEN SATURATION: 100 % | HEIGHT: 72 IN | BODY MASS INDEX: 15.31 KG/M2 | WEIGHT: 113 LBS

## 2025-05-29 DIAGNOSIS — C18.9 MALIGNANT NEOPLASM OF COLON, UNSPECIFIED PART OF COLON: Primary | ICD-10-CM

## 2025-05-29 DIAGNOSIS — M25.552 PAIN OF LEFT HIP: ICD-10-CM

## 2025-05-29 DIAGNOSIS — C78.7 SECONDARY MALIGNANT NEOPLASM OF LIVER: ICD-10-CM

## 2025-05-29 DIAGNOSIS — E43 SEVERE MALNUTRITION: ICD-10-CM

## 2025-05-29 PROCEDURE — 63600175 PHARM REV CODE 636 W HCPCS: Performed by: INTERNAL MEDICINE

## 2025-05-29 PROCEDURE — 25000003 PHARM REV CODE 250: Performed by: INTERNAL MEDICINE

## 2025-05-29 PROCEDURE — 96360 HYDRATION IV INFUSION INIT: CPT

## 2025-05-29 PROCEDURE — A4216 STERILE WATER/SALINE, 10 ML: HCPCS | Performed by: INTERNAL MEDICINE

## 2025-05-29 RX ORDER — SODIUM CHLORIDE 0.9 % (FLUSH) 0.9 %
10 SYRINGE (ML) INJECTION
OUTPATIENT
Start: 2025-05-29

## 2025-05-29 RX ORDER — SODIUM CHLORIDE 0.9 % (FLUSH) 0.9 %
10 SYRINGE (ML) INJECTION
Status: DISCONTINUED | OUTPATIENT
Start: 2025-05-29 | End: 2025-05-29 | Stop reason: HOSPADM

## 2025-05-29 RX ORDER — HEPARIN 100 UNIT/ML
500 SYRINGE INTRAVENOUS
OUTPATIENT
Start: 2025-05-29

## 2025-05-29 RX ORDER — HEPARIN 100 UNIT/ML
500 SYRINGE INTRAVENOUS
Status: DISCONTINUED | OUTPATIENT
Start: 2025-05-29 | End: 2025-05-29 | Stop reason: HOSPADM

## 2025-05-29 RX ORDER — PROCHLORPERAZINE EDISYLATE 5 MG/ML
5 INJECTION INTRAMUSCULAR; INTRAVENOUS ONCE AS NEEDED
Status: DISCONTINUED | OUTPATIENT
Start: 2025-05-29 | End: 2025-05-29 | Stop reason: HOSPADM

## 2025-05-29 RX ADMIN — Medication 10 ML: at 02:05

## 2025-05-29 RX ADMIN — SODIUM CHLORIDE 1000 ML: 9 INJECTION, SOLUTION INTRAVENOUS at 12:05

## 2025-05-29 RX ADMIN — HEPARIN 500 UNITS: 100 SYRINGE at 02:05

## 2025-06-06 ENCOUNTER — HOSPITAL ENCOUNTER (OUTPATIENT)
Dept: RADIOLOGY | Facility: HOSPITAL | Age: 65
Discharge: HOME | End: 2025-06-06
Attending: INTERNAL MEDICINE
Payer: COMMERCIAL

## 2025-06-06 DIAGNOSIS — M25.552 PAIN OF LEFT HIP: ICD-10-CM

## 2025-06-06 DIAGNOSIS — C18.9 MALIGNANT NEOPLASM OF COLON, UNSPECIFIED PART OF COLON: ICD-10-CM

## 2025-06-06 DIAGNOSIS — C78.7 SECONDARY MALIGNANT NEOPLASM OF LIVER: ICD-10-CM

## 2025-06-06 PROCEDURE — 73702 CT LWR EXTREMITY W/O&W/DYE: CPT | Mod: TC,LT

## 2025-06-06 PROCEDURE — 25500020 PHARM REV CODE 255: Performed by: INTERNAL MEDICINE

## 2025-06-06 RX ORDER — IOPAMIDOL 755 MG/ML
100 INJECTION, SOLUTION INTRAVASCULAR
Status: COMPLETED | OUTPATIENT
Start: 2025-06-06 | End: 2025-06-06

## 2025-06-06 RX ADMIN — IOPAMIDOL 100 ML: 755 INJECTION, SOLUTION INTRAVENOUS at 02:06

## 2025-06-09 ENCOUNTER — OFFICE VISIT (OUTPATIENT)
Dept: HEMATOLOGY/ONCOLOGY | Facility: CLINIC | Age: 65
End: 2025-06-09
Payer: COMMERCIAL

## 2025-06-09 ENCOUNTER — LAB VISIT (OUTPATIENT)
Dept: LAB | Facility: HOSPITAL | Age: 65
End: 2025-06-09
Attending: INTERNAL MEDICINE
Payer: COMMERCIAL

## 2025-06-09 VITALS
WEIGHT: 103 LBS | OXYGEN SATURATION: 100 % | SYSTOLIC BLOOD PRESSURE: 95 MMHG | BODY MASS INDEX: 13.95 KG/M2 | HEIGHT: 72 IN | DIASTOLIC BLOOD PRESSURE: 73 MMHG | HEART RATE: 77 BPM | RESPIRATION RATE: 18 BRPM

## 2025-06-09 DIAGNOSIS — M25.552 PAIN OF LEFT HIP: ICD-10-CM

## 2025-06-09 DIAGNOSIS — C78.7 METASTASIS TO LIVER: Chronic | ICD-10-CM

## 2025-06-09 DIAGNOSIS — C18.9 MALIGNANT NEOPLASM OF COLON, UNSPECIFIED PART OF COLON: ICD-10-CM

## 2025-06-09 DIAGNOSIS — C18.9 MALIGNANT NEOPLASM OF COLON, UNSPECIFIED PART OF COLON: Primary | ICD-10-CM

## 2025-06-09 DIAGNOSIS — C78.7 SECONDARY MALIGNANT NEOPLASM OF LIVER: ICD-10-CM

## 2025-06-09 LAB
ALBUMIN SERPL-MCNC: 3.5 G/DL (ref 3.4–4.8)
ALBUMIN/GLOB SERPL: 0.9 RATIO (ref 1.1–2)
ALP SERPL-CCNC: 212 UNIT/L (ref 40–150)
ALT SERPL-CCNC: 12 UNIT/L (ref 0–55)
ANION GAP SERPL CALC-SCNC: 10 MEQ/L
AST SERPL-CCNC: 27 UNIT/L (ref 11–45)
BASOPHILS # BLD AUTO: 0.02 X10(3)/MCL
BASOPHILS NFR BLD AUTO: 0.4 %
BILIRUB SERPL-MCNC: 1 MG/DL
BUN SERPL-MCNC: 32.6 MG/DL (ref 8.4–25.7)
CALCIUM SERPL-MCNC: 9.6 MG/DL (ref 8.8–10)
CEA SERPL-MCNC: 218.99 NG/ML (ref 0–3)
CHLORIDE SERPL-SCNC: 107 MMOL/L (ref 98–107)
CO2 SERPL-SCNC: 24 MMOL/L (ref 23–31)
CREAT SERPL-MCNC: 0.81 MG/DL (ref 0.72–1.25)
CREAT/UREA NIT SERPL: 40
EOSINOPHIL # BLD AUTO: 0.01 X10(3)/MCL (ref 0–0.9)
EOSINOPHIL NFR BLD AUTO: 0.2 %
ERYTHROCYTE [DISTWIDTH] IN BLOOD BY AUTOMATED COUNT: 18.9 % (ref 11.5–17)
GFR SERPLBLD CREATININE-BSD FMLA CKD-EPI: >60 ML/MIN/1.73/M2
GLOBULIN SER-MCNC: 3.8 GM/DL (ref 2.4–3.5)
GLUCOSE SERPL-MCNC: 108 MG/DL (ref 82–115)
HCT VFR BLD AUTO: 38.2 % (ref 42–52)
HGB BLD-MCNC: 13.2 G/DL (ref 14–18)
IMM GRANULOCYTES # BLD AUTO: 0.01 X10(3)/MCL (ref 0–0.04)
IMM GRANULOCYTES NFR BLD AUTO: 0.2 %
LYMPHOCYTES # BLD AUTO: 2.38 X10(3)/MCL (ref 0.6–4.6)
LYMPHOCYTES NFR BLD AUTO: 52.9 %
MAGNESIUM SERPL-MCNC: 2 MG/DL (ref 1.6–2.6)
MCH RBC QN AUTO: 33.5 PG (ref 27–31)
MCHC RBC AUTO-ENTMCNC: 34.6 G/DL (ref 33–36)
MCV RBC AUTO: 97 FL (ref 80–94)
MONOCYTES # BLD AUTO: 0.36 X10(3)/MCL (ref 0.1–1.3)
MONOCYTES NFR BLD AUTO: 8 %
NEUTROPHILS # BLD AUTO: 1.72 X10(3)/MCL (ref 2.1–9.2)
NEUTROPHILS NFR BLD AUTO: 38.3 %
PLATELET # BLD AUTO: 140 X10(3)/MCL (ref 130–400)
PMV BLD AUTO: 9 FL (ref 7.4–10.4)
POTASSIUM SERPL-SCNC: 4.3 MMOL/L (ref 3.5–5.1)
PROT SERPL-MCNC: 7.3 GM/DL (ref 5.8–7.6)
RBC # BLD AUTO: 3.94 X10(6)/MCL (ref 4.7–6.1)
SODIUM SERPL-SCNC: 141 MMOL/L (ref 136–145)
WBC # BLD AUTO: 4.5 X10(3)/MCL (ref 4.5–11.5)

## 2025-06-09 PROCEDURE — 1159F MED LIST DOCD IN RCRD: CPT | Mod: CPTII,S$GLB,, | Performed by: INTERNAL MEDICINE

## 2025-06-09 PROCEDURE — 36415 COLL VENOUS BLD VENIPUNCTURE: CPT

## 2025-06-09 PROCEDURE — 3074F SYST BP LT 130 MM HG: CPT | Mod: CPTII,S$GLB,, | Performed by: INTERNAL MEDICINE

## 2025-06-09 PROCEDURE — 83735 ASSAY OF MAGNESIUM: CPT

## 2025-06-09 PROCEDURE — 85025 COMPLETE CBC W/AUTO DIFF WBC: CPT

## 2025-06-09 PROCEDURE — 3008F BODY MASS INDEX DOCD: CPT | Mod: CPTII,S$GLB,, | Performed by: INTERNAL MEDICINE

## 2025-06-09 PROCEDURE — 3078F DIAST BP <80 MM HG: CPT | Mod: CPTII,S$GLB,, | Performed by: INTERNAL MEDICINE

## 2025-06-09 PROCEDURE — 82378 CARCINOEMBRYONIC ANTIGEN: CPT

## 2025-06-09 PROCEDURE — 99214 OFFICE O/P EST MOD 30 MIN: CPT | Mod: S$GLB,,, | Performed by: INTERNAL MEDICINE

## 2025-06-09 PROCEDURE — 3288F FALL RISK ASSESSMENT DOCD: CPT | Mod: CPTII,S$GLB,, | Performed by: INTERNAL MEDICINE

## 2025-06-09 PROCEDURE — 99999 PR PBB SHADOW E&M-EST. PATIENT-LVL IV: CPT | Mod: PBBFAC,,, | Performed by: INTERNAL MEDICINE

## 2025-06-09 PROCEDURE — G2211 COMPLEX E/M VISIT ADD ON: HCPCS | Mod: S$GLB,,, | Performed by: INTERNAL MEDICINE

## 2025-06-09 PROCEDURE — 1101F PT FALLS ASSESS-DOCD LE1/YR: CPT | Mod: CPTII,S$GLB,, | Performed by: INTERNAL MEDICINE

## 2025-06-09 PROCEDURE — 1160F RVW MEDS BY RX/DR IN RCRD: CPT | Mod: CPTII,S$GLB,, | Performed by: INTERNAL MEDICINE

## 2025-06-09 PROCEDURE — 80053 COMPREHEN METABOLIC PANEL: CPT

## 2025-06-09 RX ORDER — HEPARIN 100 UNIT/ML
500 SYRINGE INTRAVENOUS
Status: CANCELLED | OUTPATIENT
Start: 2025-06-10

## 2025-06-09 RX ORDER — HEPARIN 100 UNIT/ML
500 SYRINGE INTRAVENOUS
Status: CANCELLED | OUTPATIENT
Start: 2025-06-12

## 2025-06-09 RX ORDER — PROCHLORPERAZINE EDISYLATE 5 MG/ML
5 INJECTION INTRAMUSCULAR; INTRAVENOUS ONCE AS NEEDED
Status: CANCELLED | OUTPATIENT
Start: 2025-06-12

## 2025-06-09 RX ORDER — SODIUM CHLORIDE 0.9 % (FLUSH) 0.9 %
10 SYRINGE (ML) INJECTION
Status: CANCELLED | OUTPATIENT
Start: 2025-06-10

## 2025-06-09 RX ORDER — PROCHLORPERAZINE EDISYLATE 5 MG/ML
5 INJECTION INTRAMUSCULAR; INTRAVENOUS ONCE AS NEEDED
Status: CANCELLED | OUTPATIENT
Start: 2025-06-10

## 2025-06-09 RX ORDER — FLUOROURACIL 50 MG/ML
400 INJECTION, SOLUTION INTRAVENOUS
Status: CANCELLED | OUTPATIENT
Start: 2025-06-10

## 2025-06-09 RX ORDER — DIPHENHYDRAMINE HYDROCHLORIDE 50 MG/ML
50 INJECTION, SOLUTION INTRAMUSCULAR; INTRAVENOUS ONCE AS NEEDED
Status: CANCELLED | OUTPATIENT
Start: 2025-06-10

## 2025-06-09 RX ORDER — DIPHENHYDRAMINE HYDROCHLORIDE 50 MG/ML
25 INJECTION, SOLUTION INTRAMUSCULAR; INTRAVENOUS
Status: CANCELLED
Start: 2025-06-10

## 2025-06-09 RX ORDER — SODIUM CHLORIDE 0.9 % (FLUSH) 0.9 %
10 SYRINGE (ML) INJECTION
Status: CANCELLED | OUTPATIENT
Start: 2025-06-12

## 2025-06-09 RX ORDER — EPINEPHRINE 0.3 MG/.3ML
0.3 INJECTION SUBCUTANEOUS ONCE AS NEEDED
Status: CANCELLED | OUTPATIENT
Start: 2025-06-10
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
## 2025-06-10 ENCOUNTER — CLINICAL SUPPORT (OUTPATIENT)
Dept: HEMATOLOGY/ONCOLOGY | Facility: CLINIC | Age: 65
End: 2025-06-10
Payer: COMMERCIAL

## 2025-06-10 ENCOUNTER — LAB VISIT (OUTPATIENT)
Dept: LAB | Facility: HOSPITAL | Age: 65
End: 2025-06-10
Attending: INTERNAL MEDICINE
Payer: COMMERCIAL

## 2025-06-10 ENCOUNTER — INFUSION (OUTPATIENT)
Dept: INFUSION THERAPY | Facility: HOSPITAL | Age: 65
End: 2025-06-10
Attending: INTERNAL MEDICINE
Payer: COMMERCIAL

## 2025-06-10 VITALS
RESPIRATION RATE: 18 BRPM | BODY MASS INDEX: 13.94 KG/M2 | TEMPERATURE: 98 F | HEIGHT: 72 IN | DIASTOLIC BLOOD PRESSURE: 69 MMHG | SYSTOLIC BLOOD PRESSURE: 100 MMHG | HEART RATE: 77 BPM | WEIGHT: 102.94 LBS | OXYGEN SATURATION: 99 %

## 2025-06-10 DIAGNOSIS — C18.9 MALIGNANT NEOPLASM OF COLON, UNSPECIFIED PART OF COLON: Primary | ICD-10-CM

## 2025-06-10 DIAGNOSIS — R63.0 DECREASED APPETITE: ICD-10-CM

## 2025-06-10 DIAGNOSIS — C78.7 METASTASIS TO LIVER: ICD-10-CM

## 2025-06-10 DIAGNOSIS — C18.9 MALIGNANT NEOPLASM OF COLON, UNSPECIFIED PART OF COLON: ICD-10-CM

## 2025-06-10 LAB — PROT UR QL STRIP: ABNORMAL

## 2025-06-10 PROCEDURE — 96367 TX/PROPH/DG ADDL SEQ IV INF: CPT

## 2025-06-10 PROCEDURE — 25000003 PHARM REV CODE 250: Performed by: INTERNAL MEDICINE

## 2025-06-10 PROCEDURE — 96415 CHEMO IV INFUSION ADDL HR: CPT

## 2025-06-10 PROCEDURE — 96413 CHEMO IV INFUSION 1 HR: CPT

## 2025-06-10 PROCEDURE — 63600175 PHARM REV CODE 636 W HCPCS: Performed by: INTERNAL MEDICINE

## 2025-06-10 PROCEDURE — 96417 CHEMO IV INFUS EACH ADDL SEQ: CPT

## 2025-06-10 PROCEDURE — 99999 PR PBB SHADOW E&M-EST. PATIENT-LVL I: CPT | Mod: PBBFAC,,,

## 2025-06-10 PROCEDURE — 96368 THER/DIAG CONCURRENT INF: CPT

## 2025-06-10 PROCEDURE — 81005 URINALYSIS: CPT

## 2025-06-10 PROCEDURE — 96375 TX/PRO/DX INJ NEW DRUG ADDON: CPT

## 2025-06-10 PROCEDURE — 96411 CHEMO IV PUSH ADDL DRUG: CPT

## 2025-06-10 RX ORDER — FLUOROURACIL 50 MG/ML
400 INJECTION, SOLUTION INTRAVENOUS
Status: COMPLETED | OUTPATIENT
Start: 2025-06-10 | End: 2025-06-10

## 2025-06-10 RX ORDER — DIPHENHYDRAMINE HYDROCHLORIDE 50 MG/ML
50 INJECTION, SOLUTION INTRAMUSCULAR; INTRAVENOUS ONCE AS NEEDED
Status: DISCONTINUED | OUTPATIENT
Start: 2025-06-10 | End: 2025-06-10 | Stop reason: HOSPADM

## 2025-06-10 RX ORDER — DIPHENHYDRAMINE HYDROCHLORIDE 50 MG/ML
25 INJECTION, SOLUTION INTRAMUSCULAR; INTRAVENOUS
Status: COMPLETED | OUTPATIENT
Start: 2025-06-10 | End: 2025-06-10

## 2025-06-10 RX ORDER — HEPARIN 100 UNIT/ML
500 SYRINGE INTRAVENOUS
Status: DISCONTINUED | OUTPATIENT
Start: 2025-06-10 | End: 2025-06-10 | Stop reason: HOSPADM

## 2025-06-10 RX ORDER — SODIUM CHLORIDE 0.9 % (FLUSH) 0.9 %
10 SYRINGE (ML) INJECTION
Status: DISCONTINUED | OUTPATIENT
Start: 2025-06-10 | End: 2025-06-10 | Stop reason: HOSPADM

## 2025-06-10 RX ORDER — MEGESTROL ACETATE 125 MG/ML
625 SUSPENSION ORAL DAILY
Qty: 150 ML | Refills: 1 | Status: SHIPPED | OUTPATIENT
Start: 2025-06-10 | End: 2025-08-09

## 2025-06-10 RX ORDER — PROCHLORPERAZINE EDISYLATE 5 MG/ML
5 INJECTION INTRAMUSCULAR; INTRAVENOUS ONCE AS NEEDED
Status: DISCONTINUED | OUTPATIENT
Start: 2025-06-10 | End: 2025-06-10 | Stop reason: HOSPADM

## 2025-06-10 RX ORDER — EPINEPHRINE 0.3 MG/.3ML
0.3 INJECTION SUBCUTANEOUS ONCE AS NEEDED
Status: DISCONTINUED | OUTPATIENT
Start: 2025-06-10 | End: 2025-06-10 | Stop reason: HOSPADM

## 2025-06-10 RX ADMIN — FLUOROURACIL 615 MG: 50 INJECTION, SOLUTION INTRAVENOUS at 01:06

## 2025-06-10 RX ADMIN — OXALIPLATIN 131 MG: 5 INJECTION, SOLUTION INTRAVENOUS at 11:06

## 2025-06-10 RX ADMIN — SODIUM CHLORIDE: 9 INJECTION, SOLUTION INTRAVENOUS at 09:06

## 2025-06-10 RX ADMIN — DEXTROSE MONOHYDRATE: 50 INJECTION, SOLUTION INTRAVENOUS at 11:06

## 2025-06-10 RX ADMIN — DIPHENHYDRAMINE HYDROCHLORIDE 25 MG: 50 INJECTION INTRAMUSCULAR; INTRAVENOUS at 09:06

## 2025-06-10 RX ADMIN — DEXTROSE MONOHYDRATE 600 MG: 5 INJECTION, SOLUTION INTRAVENOUS at 11:06

## 2025-06-10 RX ADMIN — DEXAMETHASONE SODIUM PHOSPHATE 0.25 MG: 4 INJECTION, SOLUTION INTRA-ARTICULAR; INTRALESIONAL; INTRAMUSCULAR; INTRAVENOUS; SOFT TISSUE at 09:06

## 2025-06-10 RX ADMIN — FLUOROURACIL 3695 MG: 50 INJECTION, SOLUTION INTRAVENOUS at 01:06

## 2025-06-10 RX ADMIN — SODIUM CHLORIDE 225 MG: 9 INJECTION, SOLUTION INTRAVENOUS at 10:06
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
## 2025-06-10 NOTE — PLAN OF CARE
C5 D1 FOLFOX + Zirabev given, tolerated well. Discharged in stable condition and is aware of future appointments.

## 2025-06-12 ENCOUNTER — INFUSION (OUTPATIENT)
Dept: INFUSION THERAPY | Facility: HOSPITAL | Age: 65
End: 2025-06-12
Attending: INTERNAL MEDICINE
Payer: COMMERCIAL

## 2025-06-12 VITALS
DIASTOLIC BLOOD PRESSURE: 68 MMHG | HEART RATE: 74 BPM | SYSTOLIC BLOOD PRESSURE: 102 MMHG | OXYGEN SATURATION: 99 % | TEMPERATURE: 98 F | RESPIRATION RATE: 18 BRPM

## 2025-06-12 DIAGNOSIS — C18.9 MALIGNANT NEOPLASM OF COLON, UNSPECIFIED PART OF COLON: Primary | ICD-10-CM

## 2025-06-12 PROCEDURE — 63600175 PHARM REV CODE 636 W HCPCS: Performed by: INTERNAL MEDICINE

## 2025-06-12 PROCEDURE — 25000003 PHARM REV CODE 250: Performed by: INTERNAL MEDICINE

## 2025-06-12 PROCEDURE — A4216 STERILE WATER/SALINE, 10 ML: HCPCS | Performed by: INTERNAL MEDICINE

## 2025-06-12 RX ORDER — HEPARIN 100 UNIT/ML
500 SYRINGE INTRAVENOUS
Status: DISCONTINUED | OUTPATIENT
Start: 2025-06-12 | End: 2025-06-12 | Stop reason: HOSPADM

## 2025-06-12 RX ORDER — PROCHLORPERAZINE EDISYLATE 5 MG/ML
5 INJECTION INTRAMUSCULAR; INTRAVENOUS ONCE AS NEEDED
Status: DISCONTINUED | OUTPATIENT
Start: 2025-06-12 | End: 2025-06-12 | Stop reason: HOSPADM

## 2025-06-12 RX ORDER — SODIUM CHLORIDE 0.9 % (FLUSH) 0.9 %
10 SYRINGE (ML) INJECTION
Status: DISCONTINUED | OUTPATIENT
Start: 2025-06-12 | End: 2025-06-12 | Stop reason: HOSPADM

## 2025-06-12 RX ADMIN — HEPARIN 500 UNITS: 100 SYRINGE at 11:06

## 2025-06-12 RX ADMIN — Medication 10 ML: at 11:06

## 2025-06-12 NOTE — NURSING
CADD pump disconnected, tolerated well. Discharged in stable condition and is aware of future appointments.

## 2025-06-23 ENCOUNTER — HOSPITAL ENCOUNTER (INPATIENT)
Facility: HOSPITAL | Age: 65
LOS: 2 days | Discharge: HOME HEALTH | DRG: 865 | End: 2025-06-25
Attending: INTERNAL MEDICINE | Admitting: INTERNAL MEDICINE
Payer: COMMERCIAL

## 2025-06-23 DIAGNOSIS — C78.7 COLON CANCER METASTASIZED TO LIVER: ICD-10-CM

## 2025-06-23 DIAGNOSIS — R79.89 ELEVATED TROPONIN: ICD-10-CM

## 2025-06-23 DIAGNOSIS — R07.9 CHEST PAIN: ICD-10-CM

## 2025-06-23 DIAGNOSIS — C18.9 MALIGNANT NEOPLASM OF COLON, UNSPECIFIED PART OF COLON: ICD-10-CM

## 2025-06-23 DIAGNOSIS — R53.1 GENERALIZED WEAKNESS: ICD-10-CM

## 2025-06-23 DIAGNOSIS — C18.9 COLON CANCER METASTASIZED TO LIVER: ICD-10-CM

## 2025-06-23 DIAGNOSIS — E43 SEVERE MALNUTRITION: Primary | ICD-10-CM

## 2025-06-23 LAB
ALBUMIN SERPL-MCNC: 3.5 G/DL (ref 3.4–4.8)
ALBUMIN/GLOB SERPL: 0.9 RATIO (ref 1.1–2)
ALP SERPL-CCNC: 135 UNIT/L (ref 40–150)
ALT SERPL-CCNC: 9 UNIT/L (ref 0–55)
ANION GAP SERPL CALC-SCNC: 15 MEQ/L
AST SERPL-CCNC: 28 UNIT/L (ref 11–45)
B PERT.PT PRMT NPH QL NAA+NON-PROBE: NOT DETECTED
BACTERIA #/AREA URNS AUTO: ABNORMAL /HPF
BASOPHILS # BLD AUTO: 0.03 X10(3)/MCL
BASOPHILS NFR BLD AUTO: 0.8 %
BILIRUB SERPL-MCNC: 1.6 MG/DL
BILIRUB UR QL STRIP.AUTO: NEGATIVE
BNP BLD-MCNC: 80.5 PG/ML
BUN SERPL-MCNC: 51.1 MG/DL (ref 8.4–25.7)
C PNEUM DNA NPH QL NAA+NON-PROBE: NOT DETECTED
CALCIUM SERPL-MCNC: 9.5 MG/DL (ref 8.8–10)
CHLORIDE SERPL-SCNC: 109 MMOL/L (ref 98–107)
CLARITY UR: CLEAR
CO2 SERPL-SCNC: 19 MMOL/L (ref 23–31)
COLOR UR AUTO: YELLOW
CREAT SERPL-MCNC: 0.82 MG/DL (ref 0.72–1.25)
CREAT/UREA NIT SERPL: 62
EOSINOPHIL # BLD AUTO: 0 X10(3)/MCL (ref 0–0.9)
EOSINOPHIL NFR BLD AUTO: 0 %
ERYTHROCYTE [DISTWIDTH] IN BLOOD BY AUTOMATED COUNT: 21 % (ref 11.5–17)
ETHANOL SERPL-MCNC: <10 MG/DL
FLUAV AG UPPER RESP QL IA.RAPID: NOT DETECTED
FLUBV AG UPPER RESP QL IA.RAPID: NOT DETECTED
GFR SERPLBLD CREATININE-BSD FMLA CKD-EPI: >60 ML/MIN/1.73/M2
GLOBULIN SER-MCNC: 3.8 GM/DL (ref 2.4–3.5)
GLUCOSE SERPL-MCNC: 134 MG/DL (ref 82–115)
GLUCOSE UR QL STRIP: NORMAL
HADV DNA NPH QL NAA+NON-PROBE: NOT DETECTED
HCOV 229E RNA NPH QL NAA+NON-PROBE: NOT DETECTED
HCOV HKU1 RNA NPH QL NAA+NON-PROBE: NOT DETECTED
HCOV NL63 RNA NPH QL NAA+NON-PROBE: NOT DETECTED
HCOV OC43 RNA NPH QL NAA+NON-PROBE: NOT DETECTED
HCT VFR BLD AUTO: 38.2 % (ref 42–52)
HGB BLD-MCNC: 13.2 G/DL (ref 14–18)
HGB UR QL STRIP: NEGATIVE
HMPV RNA NPH QL NAA+NON-PROBE: NOT DETECTED
HPIV1 RNA NPH QL NAA+NON-PROBE: NOT DETECTED
HPIV2 RNA NPH QL NAA+NON-PROBE: NOT DETECTED
HPIV3 RNA NPH QL NAA+NON-PROBE: NOT DETECTED
HPIV4 RNA NPH QL NAA+NON-PROBE: NOT DETECTED
IMM GRANULOCYTES # BLD AUTO: 0.01 X10(3)/MCL (ref 0–0.04)
IMM GRANULOCYTES NFR BLD AUTO: 0.3 %
INR PPP: 1.4
KETONES UR QL STRIP: ABNORMAL
LEUKOCYTE ESTERASE UR QL STRIP: NEGATIVE
LYMPHOCYTES # BLD AUTO: 1.85 X10(3)/MCL (ref 0.6–4.6)
LYMPHOCYTES NFR BLD AUTO: 51.5 %
M PNEUMO DNA NPH QL NAA+NON-PROBE: NOT DETECTED
MAGNESIUM SERPL-MCNC: 2.1 MG/DL (ref 1.6–2.6)
MCH RBC QN AUTO: 34 PG (ref 27–31)
MCHC RBC AUTO-ENTMCNC: 34.6 G/DL (ref 33–36)
MCV RBC AUTO: 98.5 FL (ref 80–94)
MONOCYTES # BLD AUTO: 0.4 X10(3)/MCL (ref 0.1–1.3)
MONOCYTES NFR BLD AUTO: 11.1 %
MUCOUS THREADS URNS QL MICRO: ABNORMAL /LPF
NEUTROPHILS # BLD AUTO: 1.3 X10(3)/MCL (ref 2.1–9.2)
NEUTROPHILS NFR BLD AUTO: 36.3 %
NITRITE UR QL STRIP: NEGATIVE
NRBC BLD AUTO-RTO: 0 %
OHS QRS DURATION: 114 MS
OHS QTC CALCULATION: 462 MS
PH UR STRIP: 5.5 [PH]
PHOSPHATE SERPL-MCNC: 3.6 MG/DL (ref 2.3–4.7)
PLATELET # BLD AUTO: 149 X10(3)/MCL (ref 130–400)
PMV BLD AUTO: 9.6 FL (ref 7.4–10.4)
POTASSIUM SERPL-SCNC: 4.5 MMOL/L (ref 3.5–5.1)
PROT SERPL-MCNC: 7.3 GM/DL (ref 5.8–7.6)
PROT UR QL STRIP: ABNORMAL
PROTHROMBIN TIME: 16.7 SECONDS (ref 12.5–14.5)
RBC # BLD AUTO: 3.88 X10(6)/MCL (ref 4.7–6.1)
RBC #/AREA URNS AUTO: ABNORMAL /HPF
RSV A 5' UTR RNA NPH QL NAA+PROBE: NOT DETECTED
RSV RNA NPH QL NAA+NON-PROBE: NOT DETECTED
RV+EV RNA NPH QL NAA+NON-PROBE: NOT DETECTED
SARS-COV-2 RNA RESP QL NAA+PROBE: NOT DETECTED
SODIUM SERPL-SCNC: 143 MMOL/L (ref 136–145)
SP GR UR STRIP.AUTO: 1.03 (ref 1–1.03)
SQUAMOUS #/AREA URNS LPF: ABNORMAL /HPF
TROPONIN I SERPL-MCNC: 0.04 NG/ML (ref 0–0.04)
TROPONIN I SERPL-MCNC: 0.05 NG/ML (ref 0–0.04)
TROPONIN I SERPL-MCNC: 0.05 NG/ML (ref 0–0.04)
TSH SERPL-ACNC: 2.17 UIU/ML (ref 0.35–4.94)
UROBILINOGEN UR STRIP-ACNC: 2
WBC # BLD AUTO: 3.59 X10(3)/MCL (ref 4.5–11.5)
WBC #/AREA URNS AUTO: ABNORMAL /HPF

## 2025-06-23 PROCEDURE — 25000003 PHARM REV CODE 250: Performed by: INTERNAL MEDICINE

## 2025-06-23 PROCEDURE — 96360 HYDRATION IV INFUSION INIT: CPT

## 2025-06-23 PROCEDURE — 0241U COVID/RSV/FLU A&B PCR: CPT | Performed by: INTERNAL MEDICINE

## 2025-06-23 PROCEDURE — 83735 ASSAY OF MAGNESIUM: CPT | Performed by: INTERNAL MEDICINE

## 2025-06-23 PROCEDURE — 11000001 HC ACUTE MED/SURG PRIVATE ROOM

## 2025-06-23 PROCEDURE — 80053 COMPREHEN METABOLIC PANEL: CPT | Performed by: INTERNAL MEDICINE

## 2025-06-23 PROCEDURE — 84100 ASSAY OF PHOSPHORUS: CPT | Performed by: INTERNAL MEDICINE

## 2025-06-23 PROCEDURE — 82077 ASSAY SPEC XCP UR&BREATH IA: CPT | Performed by: INTERNAL MEDICINE

## 2025-06-23 PROCEDURE — 84443 ASSAY THYROID STIM HORMONE: CPT | Performed by: INTERNAL MEDICINE

## 2025-06-23 PROCEDURE — 93010 ELECTROCARDIOGRAM REPORT: CPT | Mod: ,,, | Performed by: STUDENT IN AN ORGANIZED HEALTH CARE EDUCATION/TRAINING PROGRAM

## 2025-06-23 PROCEDURE — 84484 ASSAY OF TROPONIN QUANT: CPT | Performed by: INTERNAL MEDICINE

## 2025-06-23 PROCEDURE — 51798 US URINE CAPACITY MEASURE: CPT

## 2025-06-23 PROCEDURE — 81001 URINALYSIS AUTO W/SCOPE: CPT | Performed by: INTERNAL MEDICINE

## 2025-06-23 PROCEDURE — 83880 ASSAY OF NATRIURETIC PEPTIDE: CPT | Performed by: INTERNAL MEDICINE

## 2025-06-23 PROCEDURE — 85610 PROTHROMBIN TIME: CPT | Performed by: INTERNAL MEDICINE

## 2025-06-23 PROCEDURE — 85025 COMPLETE CBC W/AUTO DIFF WBC: CPT | Performed by: INTERNAL MEDICINE

## 2025-06-23 PROCEDURE — 87581 M.PNEUMON DNA AMP PROBE: CPT | Performed by: INTERNAL MEDICINE

## 2025-06-23 PROCEDURE — 99285 EMERGENCY DEPT VISIT HI MDM: CPT | Mod: 25

## 2025-06-23 PROCEDURE — 93005 ELECTROCARDIOGRAM TRACING: CPT

## 2025-06-23 PROCEDURE — 63600175 PHARM REV CODE 636 W HCPCS: Performed by: INTERNAL MEDICINE

## 2025-06-23 RX ORDER — MIRTAZAPINE 7.5 MG/1
7.5 TABLET, FILM COATED ORAL NIGHTLY
Status: DISCONTINUED | OUTPATIENT
Start: 2025-06-23 | End: 2025-06-25 | Stop reason: HOSPADM

## 2025-06-23 RX ORDER — ACETAMINOPHEN 325 MG/1
650 TABLET ORAL EVERY 6 HOURS PRN
Status: DISCONTINUED | OUTPATIENT
Start: 2025-06-23 | End: 2025-06-25 | Stop reason: HOSPADM

## 2025-06-23 RX ORDER — POLYETHYLENE GLYCOL 3350 17 G/17G
17 POWDER, FOR SOLUTION ORAL DAILY PRN
Status: DISCONTINUED | OUTPATIENT
Start: 2025-06-23 | End: 2025-06-25 | Stop reason: HOSPADM

## 2025-06-23 RX ORDER — MUPIROCIN 20 MG/G
OINTMENT TOPICAL 2 TIMES DAILY
Status: DISCONTINUED | OUTPATIENT
Start: 2025-06-23 | End: 2025-06-25 | Stop reason: HOSPADM

## 2025-06-23 RX ORDER — ASPIRIN 81 MG/1
81 TABLET ORAL DAILY
Status: DISCONTINUED | OUTPATIENT
Start: 2025-06-24 | End: 2025-06-25 | Stop reason: HOSPADM

## 2025-06-23 RX ORDER — GLUCAGON 1 MG
1 KIT INJECTION
Status: DISCONTINUED | OUTPATIENT
Start: 2025-06-23 | End: 2025-06-25 | Stop reason: HOSPADM

## 2025-06-23 RX ORDER — NALOXONE HCL 0.4 MG/ML
0.02 VIAL (ML) INJECTION
Status: DISCONTINUED | OUTPATIENT
Start: 2025-06-23 | End: 2025-06-25 | Stop reason: HOSPADM

## 2025-06-23 RX ORDER — OXYCODONE AND ACETAMINOPHEN 10; 325 MG/1; MG/1
1 TABLET ORAL EVERY 6 HOURS PRN
Refills: 0 | Status: DISCONTINUED | OUTPATIENT
Start: 2025-06-23 | End: 2025-06-25 | Stop reason: HOSPADM

## 2025-06-23 RX ORDER — ENOXAPARIN SODIUM 100 MG/ML
40 INJECTION SUBCUTANEOUS EVERY 24 HOURS
Status: DISCONTINUED | OUTPATIENT
Start: 2025-06-23 | End: 2025-06-25 | Stop reason: HOSPADM

## 2025-06-23 RX ORDER — MEGESTROL ACETATE 40 MG/ML
625 SUSPENSION ORAL DAILY
Status: DISCONTINUED | OUTPATIENT
Start: 2025-06-24 | End: 2025-06-24

## 2025-06-23 RX ORDER — IBUPROFEN 200 MG
24 TABLET ORAL
Status: DISCONTINUED | OUTPATIENT
Start: 2025-06-23 | End: 2025-06-25 | Stop reason: HOSPADM

## 2025-06-23 RX ORDER — IBUPROFEN 200 MG
16 TABLET ORAL
Status: DISCONTINUED | OUTPATIENT
Start: 2025-06-23 | End: 2025-06-25 | Stop reason: HOSPADM

## 2025-06-23 RX ORDER — SODIUM CHLORIDE 0.9 % (FLUSH) 0.9 %
10 SYRINGE (ML) INJECTION EVERY 12 HOURS PRN
Status: DISCONTINUED | OUTPATIENT
Start: 2025-06-23 | End: 2025-06-25 | Stop reason: HOSPADM

## 2025-06-23 RX ORDER — ONDANSETRON HYDROCHLORIDE 2 MG/ML
4 INJECTION, SOLUTION INTRAVENOUS EVERY 6 HOURS PRN
Status: DISCONTINUED | OUTPATIENT
Start: 2025-06-23 | End: 2025-06-25 | Stop reason: HOSPADM

## 2025-06-23 RX ORDER — ACETAMINOPHEN 325 MG/1
650 TABLET ORAL EVERY 4 HOURS PRN
Status: DISCONTINUED | OUTPATIENT
Start: 2025-06-23 | End: 2025-06-23

## 2025-06-23 RX ADMIN — MUPIROCIN: 20 OINTMENT TOPICAL at 10:06

## 2025-06-23 RX ADMIN — MIRTAZAPINE 7.5 MG: 7.5 TABLET, FILM COATED ORAL at 09:06

## 2025-06-23 RX ADMIN — ENOXAPARIN SODIUM 40 MG: 40 INJECTION SUBCUTANEOUS at 06:06

## 2025-06-23 RX ADMIN — SODIUM CHLORIDE 1000 ML: 9 INJECTION, SOLUTION INTRAVENOUS at 02:06
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
## 2025-06-23 NOTE — ED NOTES
Bed: 31  Expected date:   Expected time:   Means of arrival:   Comments:  EMS. Cancer pt, weak, soft BP

## 2025-06-23 NOTE — ED NOTES
Confirmed that pt DOES want to be intubated if airway needs to be secured at any time per Dr. Hernandez's request. IVONE Smart witness to conversation at bedside.

## 2025-06-23 NOTE — ED NOTES
Dr. Hernandez reports no need for cardiology consult at this time. Plan to repeat trop again in 6 hours.
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
## 2025-06-24 LAB
ABS NEUT (OLG): 1.81 X10(3)/MCL (ref 2.1–9.2)
ALBUMIN SERPL-MCNC: 3.2 G/DL (ref 3.4–4.8)
ALBUMIN/GLOB SERPL: 1.1 RATIO (ref 1.1–2)
ALP SERPL-CCNC: 119 UNIT/L (ref 40–150)
ALT SERPL-CCNC: 8 UNIT/L (ref 0–55)
ANION GAP SERPL CALC-SCNC: 13 MEQ/L
ANISOCYTOSIS BLD QL SMEAR: ABNORMAL
AST SERPL-CCNC: 22 UNIT/L (ref 11–45)
BASOPHILS NFR BLD MANUAL: 0.23 X10(3)/MCL (ref 0–0.2)
BASOPHILS NFR BLD MANUAL: 5 %
BILIRUB SERPL-MCNC: 1.6 MG/DL
BUN SERPL-MCNC: 49.2 MG/DL (ref 8.4–25.7)
CALCIUM SERPL-MCNC: 9.1 MG/DL (ref 8.8–10)
CHLORIDE SERPL-SCNC: 111 MMOL/L (ref 98–107)
CO2 SERPL-SCNC: 19 MMOL/L (ref 23–31)
CREAT SERPL-MCNC: 0.66 MG/DL (ref 0.72–1.25)
CREAT/UREA NIT SERPL: 75
ERYTHROCYTE [DISTWIDTH] IN BLOOD BY AUTOMATED COUNT: 20.9 % (ref 11.5–17)
GFR SERPLBLD CREATININE-BSD FMLA CKD-EPI: >60 ML/MIN/1.73/M2
GLOBULIN SER-MCNC: 3 GM/DL (ref 2.4–3.5)
GLUCOSE SERPL-MCNC: 79 MG/DL (ref 82–115)
HCT VFR BLD AUTO: 32.2 % (ref 42–52)
HGB BLD-MCNC: 11.2 G/DL (ref 14–18)
INSTRUMENT WBC (OLG): 4.65 X10(3)/MCL
LYMPHOCYTES NFR BLD MANUAL: 2.09 X10(3)/MCL (ref 0.6–4.6)
LYMPHOCYTES NFR BLD MANUAL: 45 %
MACROCYTES BLD QL SMEAR: ABNORMAL
MCH RBC QN AUTO: 34.1 PG (ref 27–31)
MCHC RBC AUTO-ENTMCNC: 34.8 G/DL (ref 33–36)
MCV RBC AUTO: 98.2 FL (ref 80–94)
MONOCYTES NFR BLD MANUAL: 0.51 X10(3)/MCL (ref 0.1–1.3)
MONOCYTES NFR BLD MANUAL: 11 %
NEUTROPHILS NFR BLD MANUAL: 39 %
OVALOCYTES (OLG): ABNORMAL
PAPPENHEIMER BODIES (OLG): ABNORMAL
PLATELET # BLD AUTO: 168 X10(3)/MCL (ref 130–400)
PLATELET # BLD EST: NORMAL 10*3/UL
PMV BLD AUTO: 9.6 FL (ref 7.4–10.4)
POIKILOCYTOSIS BLD QL SMEAR: ABNORMAL
POTASSIUM SERPL-SCNC: 3.8 MMOL/L (ref 3.5–5.1)
PROT SERPL-MCNC: 6.2 GM/DL (ref 5.8–7.6)
RBC # BLD AUTO: 3.28 X10(6)/MCL (ref 4.7–6.1)
RBC MORPH BLD: ABNORMAL
SODIUM SERPL-SCNC: 143 MMOL/L (ref 136–145)
TROPONIN I SERPL-MCNC: 0.03 NG/ML (ref 0–0.04)
TROPONIN I SERPL-MCNC: 0.04 NG/ML (ref 0–0.04)
WBC # BLD AUTO: 4.65 X10(3)/MCL (ref 4.5–11.5)

## 2025-06-24 PROCEDURE — 21400001 HC TELEMETRY ROOM

## 2025-06-24 PROCEDURE — 36415 COLL VENOUS BLD VENIPUNCTURE: CPT | Performed by: INTERNAL MEDICINE

## 2025-06-24 PROCEDURE — 63600175 PHARM REV CODE 636 W HCPCS: Mod: JZ,TB | Performed by: INTERNAL MEDICINE

## 2025-06-24 PROCEDURE — S0179 MEGESTROL 20 MG: HCPCS | Performed by: INTERNAL MEDICINE

## 2025-06-24 PROCEDURE — 84484 ASSAY OF TROPONIN QUANT: CPT | Performed by: INTERNAL MEDICINE

## 2025-06-24 PROCEDURE — 80053 COMPREHEN METABOLIC PANEL: CPT | Performed by: INTERNAL MEDICINE

## 2025-06-24 PROCEDURE — 25000003 PHARM REV CODE 250: Performed by: INTERNAL MEDICINE

## 2025-06-24 PROCEDURE — 85025 COMPLETE CBC W/AUTO DIFF WBC: CPT | Performed by: INTERNAL MEDICINE

## 2025-06-24 RX ORDER — MEGESTROL ACETATE 40 MG/ML
400 SUSPENSION ORAL 2 TIMES DAILY
Status: DISCONTINUED | OUTPATIENT
Start: 2025-06-24 | End: 2025-06-25 | Stop reason: HOSPADM

## 2025-06-24 RX ORDER — GUAIFENESIN 600 MG/1
600 TABLET, EXTENDED RELEASE ORAL 2 TIMES DAILY
Status: DISCONTINUED | OUTPATIENT
Start: 2025-06-24 | End: 2025-06-25 | Stop reason: HOSPADM

## 2025-06-24 RX ADMIN — MIRTAZAPINE 7.5 MG: 7.5 TABLET, FILM COATED ORAL at 08:06

## 2025-06-24 RX ADMIN — MUPIROCIN: 20 OINTMENT TOPICAL at 08:06

## 2025-06-24 RX ADMIN — GUAIFENESIN 600 MG: 600 TABLET, EXTENDED RELEASE ORAL at 10:06

## 2025-06-24 RX ADMIN — Medication 81 MG: at 08:06

## 2025-06-24 RX ADMIN — MEGESTROL ACETATE 625.2 MG: 400 SUSPENSION ORAL at 08:06

## 2025-06-24 RX ADMIN — METHYLPREDNISOLONE SODIUM SUCCINATE 60 MG: 40 INJECTION, POWDER, FOR SOLUTION INTRAMUSCULAR; INTRAVENOUS at 05:06

## 2025-06-24 RX ADMIN — MEGESTROL ACETATE 400 MG: 400 SUSPENSION ORAL at 08:06

## 2025-06-24 RX ADMIN — ENOXAPARIN SODIUM 40 MG: 40 INJECTION SUBCUTANEOUS at 05:06

## 2025-06-24 NOTE — PLAN OF CARE
Problem: Infection  Goal: Absence of Infection Signs and Symptoms  Outcome: Progressing     Problem: Skin Injury Risk Increased  Goal: Skin Health and Integrity  Outcome: Progressing     Problem: Adult Inpatient Plan of Care  Goal: Plan of Care Review  Outcome: Progressing  Goal: Patient-Specific Goal (Individualized)  Outcome: Progressing  Goal: Absence of Hospital-Acquired Illness or Injury  Outcome: Progressing  Goal: Optimal Comfort and Wellbeing  Outcome: Progressing  Goal: Readiness for Transition of Care  Outcome: Progressing     Problem: Wound  Goal: Optimal Coping  Outcome: Progressing  Goal: Optimal Functional Ability  Outcome: Progressing  Goal: Absence of Infection Signs and Symptoms  Outcome: Progressing  Goal: Improved Oral Intake  Outcome: Progressing  Goal: Optimal Pain Control and Function  Outcome: Progressing  Goal: Skin Health and Integrity  Outcome: Progressing  Goal: Optimal Wound Healing  Outcome: Progressing     Problem: Fall Injury Risk  Goal: Absence of Fall and Fall-Related Injury  Outcome: Progressing

## 2025-06-24 NOTE — PLAN OF CARE
Pharmacy: [facility]  Pt states he has 1st apt with PCP Kamla Parry 7/9/25 at 2pm  Pt lives with SO Heidy Landrum who is able to help at home as needed.  Pt has 2 living children; no mPOA.     06/24/25 1352   Discharge Assessment   Assessment Type Discharge Planning Assessment   Confirmed/corrected address, phone number and insurance Yes   Confirmed Demographics Correct on Facesheet   Source of Information patient   Communicated REBECCA with patient/caregiver Date not available/Unable to determine   People in Home significant other   Name(s) of People in Home Heidy Landrum [phone]   Do you expect to return to your current living situation? Yes   Do you have help at home or someone to help you manage your care at home? Yes   Who are your caregiver(s) and their phone number(s)? Heidy Frenchard   Prior to hospitilization cognitive status: Alert/Oriented   Current cognitive status: Alert/Oriented   Walking or Climbing Stairs Difficulty yes   Walking or Climbing Stairs ambulation difficulty, requires equipment   Mobility Management straight cane prn   Dressing/Bathing Difficulty no   Home Accessibility stairs to enter home   Number of Stairs, Main Entrance four   Stair Railings, Main Entrance railing on left side (ascending)   Home Layout Able to live on 1st floor   Equipment Currently Used at Home cane, straight   Readmission within 30 days? No   Patient currently being followed by outpatient case management? No   Do you currently have service(s) that help you manage your care at home? No   Do you take prescription medications? Yes  ([facility])   Is the patient taking medications as prescribed? yes   Who is going to help you get home at discharge? Heidy   How do you get to doctors appointments? family or friend will provide   Are you on dialysis? No   Do you take coumadin? No   Discharge Plan A Home   Discharge Plan B Home   DME Needed Upon Discharge  other (see  comments)  (tbd)   Discharge Plan discussed with: Patient   Transition of Care Barriers None   Physical Activity   On average, how many days per week do you engage in moderate to strenuous exercise (like a brisk walk)? 0 days   On average, how many minutes do you engage in exercise at this level? 0 min   Financial Resource Strain   How hard is it for you to pay for the very basics like food, housing, medical care, and heating? Hard   Housing Stability   In the last 12 months, was there a time when you were not able to pay the mortgage or rent on time? N   At any time in the past 12 months, were you homeless or living in a shelter (including now)? N   Transportation Needs   In the past 12 months, has lack of transportation kept you from medical appointments or from getting medications? no   In the past 12 months, has lack of transportation kept you from meetings, work, or from getting things needed for daily living? No   Food Insecurity   Within the past 12 months, you worried that your food would run out before you got the money to buy more. Never true   Within the past 12 months, the food you bought just didn't last and you didn't have money to get more. Never true   Stress   Do you feel stress - tense, restless, nervous, or anxious, or unable to sleep at night because your mind is troubled all the time - these days? To some exte   Social Isolation   How often do you feel lonely or isolated from those around you?  Never   Alcohol Use   Q1: How often do you have a drink containing alcohol? 2-3 per wk   Q2: How many drinks containing alcohol do you have on a typical day when you are drinking? 3 or 4   Q3: How often do you have six or more drinks on one occasion? Never   Spotbros   In the past 12 months has the electric, gas, oil, or water company threatened to shut off services in your home? No   Health Literacy   How often do you need to have someone help you when you read instructions, pamphlets, or other written  material from your doctor or pharmacy? Never

## 2025-06-25 VITALS
RESPIRATION RATE: 16 BRPM | BODY MASS INDEX: 14.9 KG/M2 | DIASTOLIC BLOOD PRESSURE: 69 MMHG | HEIGHT: 72 IN | WEIGHT: 110 LBS | TEMPERATURE: 98 F | HEART RATE: 68 BPM | OXYGEN SATURATION: 100 % | SYSTOLIC BLOOD PRESSURE: 117 MMHG

## 2025-06-25 PROCEDURE — S0179 MEGESTROL 20 MG: HCPCS | Performed by: INTERNAL MEDICINE

## 2025-06-25 PROCEDURE — 25000003 PHARM REV CODE 250: Performed by: INTERNAL MEDICINE

## 2025-06-25 RX ORDER — MEGESTROL ACETATE 40 MG/ML
400 SUSPENSION ORAL 2 TIMES DAILY
Qty: 600 ML | Refills: 0 | Status: SHIPPED | OUTPATIENT
Start: 2025-06-25

## 2025-06-25 RX ORDER — METHYLPREDNISOLONE 4 MG/1
TABLET ORAL
Qty: 21 EACH | Refills: 0 | Status: SHIPPED | OUTPATIENT
Start: 2025-06-25

## 2025-06-25 RX ADMIN — GUAIFENESIN 600 MG: 600 TABLET, EXTENDED RELEASE ORAL at 08:06

## 2025-06-25 RX ADMIN — Medication 81 MG: at 08:06

## 2025-06-25 RX ADMIN — MEGESTROL ACETATE 400 MG: 400 SUSPENSION ORAL at 08:06

## 2025-06-25 RX ADMIN — MUPIROCIN: 20 OINTMENT TOPICAL at 08:06

## 2025-06-25 NOTE — DISCHARGE INSTRUCTIONS
Our goal at [facility] is to always give you outstanding care and exceptional service. You may receive a survey from [employer] by mail, text or e-mail in the next 24-48 hours asking about the care you received with us. The survey should only take 5-10 minutes to complete and is very important to us.     Your feedback provides us with a way to recognize our staff who work tirelessly to provide the best care! Also, your responses help us learn how to improve when your experience was below our aspiration of excellence. We are always looking for ways to improve your stay. We WILL use your feedback to continue making improvements to help us provide the highest quality care. We keep your personal information and feedback confidential. We appreciate your time completing this survey and can't wait to hear from you!!!    We look forward to your continued care with us! Thanks so much for choosing [facility] for your healthcare needs!

## 2025-06-25 NOTE — PROGRESS NOTES
AVS virtually reviewed with patient and son in its entirety with emphasis on diet, medications, follow-up appointments and reasons to return to the ED. Patient also encouraged to utilize their patient portal. Ease and convenience of use reiterated. Education complete and patient voiced understanding. All questions answered. Discharge teaching complete.

## 2025-06-25 NOTE — PLAN OF CARE
Problem: Infection  Goal: Absence of Infection Signs and Symptoms  Outcome: Met     Problem: Skin Injury Risk Increased  Goal: Skin Health and Integrity  Outcome: Met     Problem: Adult Inpatient Plan of Care  Goal: Plan of Care Review  Outcome: Met  Goal: Patient-Specific Goal (Individualized)  Outcome: Met  Goal: Absence of Hospital-Acquired Illness or Injury  Outcome: Met  Goal: Optimal Comfort and Wellbeing  Outcome: Met  Goal: Readiness for Transition of Care  Outcome: Met     Problem: Wound  Goal: Optimal Coping  Outcome: Met  Goal: Optimal Functional Ability  Outcome: Met  Goal: Absence of Infection Signs and Symptoms  Outcome: Met  Goal: Improved Oral Intake  Outcome: Met  Goal: Optimal Pain Control and Function  Outcome: Met  Goal: Skin Health and Integrity  Outcome: Met  Goal: Optimal Wound Healing  Outcome: Met     Problem: Fall Injury Risk  Goal: Absence of Fall and Fall-Related Injury  Outcome: Met

## 2025-06-25 NOTE — PLAN OF CARE
The patient and his Son: Roland Washington: [phone] are requesting in-home (Hospice) care/services. He was given a choice list to review for preferences. They are requesting the referral be sent to: [facility]. I contacted Azalia Dennis ([facility]: Hospice Care Consultant) she will provide an educational session on in-home hospice care/services.

## 2025-06-25 NOTE — PLAN OF CARE
06/25/25 1213   Final Note   Assessment Type Final Discharge Note   Anticipated Discharge Disposition Home-Health  (FOC: The patioent will be discharged from ([facility]) to his private residence with home health services: [facility] ([facility]). Clinical notes/updates and AVS & D/C summary were uploaded and sent via ([email or web address]) for review.)   Hospital Resources/Appts/Education Provided Appointments scheduled and added to AVS   Post-Acute Status   Post-Acute Authorization Home Health   Home Health Status Referrals Sent   Patient choice form signed by patient/caregiver List with quality metrics by geographic area provided   Discharge Delays None known at this time     The patient will be discharged from ([facility]) to his private residence with (Hospice) services through: [facility]. Clinical notes/updates and AVS & D/C summary were uploaded and sent via ([employer]) for review. The patient and his Son: Roland Vizcarra and AKUA Landrum were informed of the above discharge plans and both are in agreement. Transportation will be arranged by his family's personal vehicle.

## 2025-06-25 NOTE — PLAN OF CARE
Problem: Infection  Goal: Absence of Infection Signs and Symptoms  Outcome: Adequate for Care Transition     Problem: Skin Injury Risk Increased  Goal: Skin Health and Integrity  Outcome: Adequate for Care Transition     Problem: Adult Inpatient Plan of Care  Goal: Plan of Care Review  Outcome: Adequate for Care Transition  Goal: Patient-Specific Goal (Individualized)  Outcome: Adequate for Care Transition  Goal: Absence of Hospital-Acquired Illness or Injury  Outcome: Adequate for Care Transition  Goal: Optimal Comfort and Wellbeing  Outcome: Adequate for Care Transition  Goal: Readiness for Transition of Care  Outcome: Adequate for Care Transition     Problem: Wound  Goal: Optimal Coping  Outcome: Adequate for Care Transition  Goal: Optimal Functional Ability  Outcome: Adequate for Care Transition  Goal: Absence of Infection Signs and Symptoms  Outcome: Adequate for Care Transition  Goal: Improved Oral Intake  Outcome: Adequate for Care Transition  Goal: Optimal Pain Control and Function  Outcome: Adequate for Care Transition  Goal: Skin Health and Integrity  Outcome: Adequate for Care Transition  Goal: Optimal Wound Healing  Outcome: Adequate for Care Transition     Problem: Fall Injury Risk  Goal: Absence of Fall and Fall-Related Injury  Outcome: Adequate for Care Transition

## 2025-06-25 NOTE — PROGRESS NOTES
Hospital Medicine  Progress Note    Patient Name: Lincoln Vizcarra  MRN: [id number]  Status: IP- Inpatient   Admission Date: 6/23/2025  Length of Stay: 1  Date of Service: 06/24/2025       CC: hospital follow-up for generalized weakness       SUBJECTIVE     64 y.o. 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 associated with productive cough with clear phlegm, and decreased appetite.  Patient was actually due for next cycle of chemo today, but presented to ED instead due to above.  He denies nausea, vomiting, fever, chills, chest pain, shortness of breath, or abdominal pain.     Evaluation in ED noted patient afebrile hemodynamically stable, and oxygenating well on room air.  Labs note a mild neutropenia with a ANC of 1300, H&H and platelets adequate.  Chest x-ray is unremarkable, Influenza A&B, RSV and COVID testing all negative.  Troponin minimally elevated at 0.046.  EKG without ischemic changes.     Today: Patient seen and examined at bedside, and chart reviewed.  Troponin stable overnight.  Respiratory PCR negative.  Still reporting cough.      MEDICATIONS   Scheduled   aspirin  81 mg Oral Daily    enoxparin  40 mg Subcutaneous Daily    megestroL  400 mg Oral BID    mirtazapine  7.5 mg Oral QHS    mupirocin   Nasal BID     Continuous Infusions  None      PHYSICAL EXAM   VITALS: T 97.4 °F (36.3 °C)   /71   P 69   RR 18   O2 100 %    GENERAL: Awake and in NAD, cachectic  LUNGS: CTA anteriorly  CVS: Normal rate  GI/: Soft, NT, bowel sounds positive.  EXTREMITIES: No LE edema  NEURO: AAOx3  PSYCH: Cooperative      LABS   CBC  Recent Labs     06/23/25  1427 06/24/25  0346   WBC 3.59* 4.65  4.65   RBC 3.88* 3.28*   HGB 13.2* 11.2*   HCT 38.2* 32.2*   MCV 98.5* 98.2*   MCH 34.0* 34.1*   MCHC 34.6 34.8   RDW 21.0* 20.9*    168     CHEM  Recent  Labs     06/23/25  1427 06/24/25  0346    143   K 4.5 3.8   CO2 19* 19*   BUN 51.1* 49.2*   CREATININE 0.82 0.66*   CALCIUM 9.5 9.1   MG 2.10  --    PHOS 3.6  --    ALBUMIN 3.5 3.2*   GLOBULIN 3.8* 3.0   ALKPHOS 135 119   ALT 9 8   AST 28 22   BILITOT 1.6* 1.6*   TSH 2.170  --          ASSESSMENT   Possible viral syndrome  Stage IV adenocarcinoma of the colon with liver/bone mets  Severe protein-calorie malnutrition  NSTEMI type II     PLAN   Continue symptomatic management  Will increase megace, and protein supplementation  Otherwise continue current management and monitoring in the interim        Prophylaxis: SCDs        Ovi Hernandez MD  [facility]

## 2025-06-26 ENCOUNTER — PATIENT OUTREACH (OUTPATIENT)
Dept: ADMINISTRATIVE | Facility: CLINIC | Age: 65
End: 2025-06-26
Payer: COMMERCIAL

## 2025-06-26 NOTE — PROGRESS NOTES
TCC call not required; pt not applicable. Patient is receiving chemotherapy and discharged to [facility].

## 2025-06-26 NOTE — PHYSICIAN QUERY
Please document your best medical opinion regarding the etiology of diagnosis:  Possible viral syndrome

## 2025-06-27 ENCOUNTER — TELEPHONE (OUTPATIENT)
Dept: FAMILY MEDICINE | Facility: CLINIC | Age: 65
End: 2025-06-27
Payer: COMMERCIAL

## 2025-06-27 NOTE — TELEPHONE ENCOUNTER
Copied from CRM #[id number]. Topic: General Inquiry - Patient Advice  >> Jun 25, 2025  1:31 PM Lauren wrote:  .Who Called: [facility]     Patient is returning phone call    Who Left Message for Patient:  Does the patient know what this is regarding?:PT discharging from  and wants to know Parry will sign for home health --pls advise      Preferred Method of Contact: Phone Call  Patient's Preferred Phone Number on File: [phone]   Best Call Back Number, if different: [phone]  Additional Information:

## 2025-07-03 ENCOUNTER — PATIENT MESSAGE (OUTPATIENT)
Dept: HEMATOLOGY/ONCOLOGY | Facility: CLINIC | Age: 65
End: 2025-07-03
Payer: COMMERCIAL

## 2025-07-07 ENCOUNTER — TELEPHONE (OUTPATIENT)
Dept: FAMILY MEDICINE | Facility: CLINIC | Age: 65
End: 2025-07-07
Payer: COMMERCIAL

## 2025-07-07 NOTE — TELEPHONE ENCOUNTER
?? **Pre-Visit Call Screening**       ?? Reason for Visit  Chief complaint:  Establish primary care provider      ?? Medication Review  - Patient reminded to bring medications to visit: Yes          ?? Recent Health Events  - Any ER, urgent care, or hospital visits since last appointment: Yes    - If yes, date & location: 06/23/2025  [facility]      ?? Access and Support Needs  - Transportation issues: No  -  needed:  No

## 2025-07-11 ENCOUNTER — TELEPHONE (OUTPATIENT)
Dept: FAMILY MEDICINE | Facility: CLINIC | Age: 65
End: 2025-07-11
Payer: COMMERCIAL

## (undated) DEVICE — BOWL STERILE LARGE 32OZ

## (undated) DEVICE — GLOVE PROTEXIS LTX MICRO 6.5

## (undated) DEVICE — KIT SURGICAL TURNOVER

## (undated) DEVICE — Device

## (undated) DEVICE — ADHESIVE MASTISOL VIAL 48/BX

## (undated) DEVICE — KIT SURGICAL COLON .25 1.1OZ

## (undated) DEVICE — CONTAINER FORMALIN PREFILLED

## (undated) DEVICE — CABLE EKG DL RBBN 3 LEAD DISP

## (undated) DEVICE — DRESSING TRANS 4X4 TEGADERM

## (undated) DEVICE — MANIFOLD 4 PORT

## (undated) DEVICE — SYR 10CC LUER LOCK

## (undated) DEVICE — BAG MEDI-PLAST DECANTER C-FLOW

## (undated) DEVICE — TIP SUCTION YANKAUER

## (undated) DEVICE — COVER C-ARM STRAP BAND 44X80IN

## (undated) DEVICE — SYRINGE 0.9% NACL 10MIL PREFIL

## (undated) DEVICE — STRIP MEDI WND CLSR 1/2X4IN

## (undated) DEVICE — GLOVE PROTEXIS BLUE LATEX 7

## (undated) DEVICE — GLOVE PROTEXIS BLUE LATEX 8

## (undated) DEVICE — MARKER ENDOSCOPIC SPOT EX

## (undated) DEVICE — SOL IRRI STRL WATER 1000ML

## (undated) DEVICE — SOL NORMAL USPCA 0.9%

## (undated) DEVICE — LINER MEDI-VAC PPV FLTR 1500CC

## (undated) DEVICE — SUT CTD VICRYL BR CR/SH VIL

## (undated) DEVICE — CUP PROFEX GLASS GRADUATE 1OZ

## (undated) DEVICE — ELECTRODE PATIENT RETURN DISP

## (undated) DEVICE — SUT MCRYL PLUS 4-0 PS2 27IN

## (undated) DEVICE — FORCEP ALLIGATOR 2.8MM W/NDL

## (undated) DEVICE — NDL HYPO REG 25G X 1 1/2

## (undated) DEVICE — GLOVE PROTEXIS LTX MICRO  7.5